# Patient Record
Sex: FEMALE | Race: WHITE | NOT HISPANIC OR LATINO
[De-identification: names, ages, dates, MRNs, and addresses within clinical notes are randomized per-mention and may not be internally consistent; named-entity substitution may affect disease eponyms.]

---

## 2018-02-12 PROBLEM — Z00.00 ENCOUNTER FOR PREVENTIVE HEALTH EXAMINATION: Status: ACTIVE | Noted: 2018-02-12

## 2018-02-21 ENCOUNTER — APPOINTMENT (OUTPATIENT)
Dept: CARDIOLOGY | Facility: CLINIC | Age: 58
End: 2018-02-21
Payer: COMMERCIAL

## 2018-02-21 ENCOUNTER — NON-APPOINTMENT (OUTPATIENT)
Age: 58
End: 2018-02-21

## 2018-02-21 ENCOUNTER — APPOINTMENT (OUTPATIENT)
Dept: PULMONOLOGY | Facility: CLINIC | Age: 58
End: 2018-02-21
Payer: COMMERCIAL

## 2018-02-21 VITALS
DIASTOLIC BLOOD PRESSURE: 71 MMHG | TEMPERATURE: 98.5 F | OXYGEN SATURATION: 94 % | WEIGHT: 115 LBS | HEIGHT: 65 IN | HEART RATE: 89 BPM | BODY MASS INDEX: 19.16 KG/M2 | SYSTOLIC BLOOD PRESSURE: 110 MMHG | RESPIRATION RATE: 12 BRPM

## 2018-02-21 DIAGNOSIS — E87.1 HYPO-OSMOLALITY AND HYPONATREMIA: ICD-10-CM

## 2018-02-21 DIAGNOSIS — Z78.9 OTHER SPECIFIED HEALTH STATUS: ICD-10-CM

## 2018-02-21 PROCEDURE — 99204 OFFICE O/P NEW MOD 45 MIN: CPT | Mod: 25

## 2018-02-21 PROCEDURE — 93306 TTE W/DOPPLER COMPLETE: CPT

## 2018-02-21 PROCEDURE — 99215 OFFICE O/P EST HI 40 MIN: CPT | Mod: 25

## 2018-02-21 PROCEDURE — 94060 EVALUATION OF WHEEZING: CPT

## 2018-02-21 PROCEDURE — 93000 ELECTROCARDIOGRAM COMPLETE: CPT

## 2018-02-22 LAB
ANION GAP SERPL CALC-SCNC: 11 MMOL/L
BUN SERPL-MCNC: 10 MG/DL
CALCIUM SERPL-MCNC: 10.2 MG/DL
CHLORIDE SERPL-SCNC: 93 MMOL/L
CO2 SERPL-SCNC: 31 MMOL/L
CREAT SERPL-MCNC: 0.52 MG/DL
GLUCOSE SERPL-MCNC: 80 MG/DL
NT-PROBNP SERPL-MCNC: 38 PG/ML
POTASSIUM SERPL-SCNC: 4.3 MMOL/L
SODIUM SERPL-SCNC: 135 MMOL/L

## 2018-03-05 ENCOUNTER — APPOINTMENT (OUTPATIENT)
Dept: PULMONOLOGY | Facility: CLINIC | Age: 58
End: 2018-03-05
Payer: COMMERCIAL

## 2018-03-05 VITALS
WEIGHT: 121 LBS | BODY MASS INDEX: 20.16 KG/M2 | HEART RATE: 83 BPM | HEIGHT: 65 IN | TEMPERATURE: 98 F | RESPIRATION RATE: 12 BRPM | OXYGEN SATURATION: 94 % | DIASTOLIC BLOOD PRESSURE: 75 MMHG | SYSTOLIC BLOOD PRESSURE: 119 MMHG

## 2018-03-05 PROCEDURE — 99214 OFFICE O/P EST MOD 30 MIN: CPT | Mod: 25

## 2018-03-05 PROCEDURE — 94621 CARDIOPULM EXERCISE TESTING: CPT

## 2018-03-13 ENCOUNTER — APPOINTMENT (OUTPATIENT)
Dept: PULMONOLOGY | Facility: CLINIC | Age: 58
End: 2018-03-13

## 2018-04-02 ENCOUNTER — NON-APPOINTMENT (OUTPATIENT)
Age: 58
End: 2018-04-02

## 2018-04-02 ENCOUNTER — APPOINTMENT (OUTPATIENT)
Dept: PULMONOLOGY | Facility: CLINIC | Age: 58
End: 2018-04-02
Payer: COMMERCIAL

## 2018-04-02 VITALS
WEIGHT: 126 LBS | SYSTOLIC BLOOD PRESSURE: 115 MMHG | DIASTOLIC BLOOD PRESSURE: 75 MMHG | TEMPERATURE: 98.2 F | HEIGHT: 65 IN | BODY MASS INDEX: 20.99 KG/M2 | OXYGEN SATURATION: 94 % | RESPIRATION RATE: 12 BRPM | HEART RATE: 88 BPM

## 2018-04-02 PROCEDURE — 94060 EVALUATION OF WHEEZING: CPT

## 2018-04-02 PROCEDURE — 99214 OFFICE O/P EST MOD 30 MIN: CPT | Mod: 25

## 2018-04-09 ENCOUNTER — RESULT CHARGE (OUTPATIENT)
Age: 58
End: 2018-04-09

## 2018-05-23 ENCOUNTER — APPOINTMENT (OUTPATIENT)
Dept: CARDIOLOGY | Facility: CLINIC | Age: 58
End: 2018-05-23

## 2018-06-04 ENCOUNTER — NON-APPOINTMENT (OUTPATIENT)
Age: 58
End: 2018-06-04

## 2018-06-04 ENCOUNTER — APPOINTMENT (OUTPATIENT)
Dept: PULMONOLOGY | Facility: CLINIC | Age: 58
End: 2018-06-04
Payer: COMMERCIAL

## 2018-06-04 ENCOUNTER — APPOINTMENT (OUTPATIENT)
Dept: CARDIOLOGY | Facility: CLINIC | Age: 58
End: 2018-06-04
Payer: COMMERCIAL

## 2018-06-04 VITALS
OXYGEN SATURATION: 94 % | RESPIRATION RATE: 12 BRPM | SYSTOLIC BLOOD PRESSURE: 124 MMHG | BODY MASS INDEX: 21.33 KG/M2 | HEART RATE: 90 BPM | HEIGHT: 65 IN | WEIGHT: 128 LBS | DIASTOLIC BLOOD PRESSURE: 82 MMHG

## 2018-06-04 PROCEDURE — 95800 SLP STDY UNATTENDED: CPT | Mod: 26

## 2018-06-04 PROCEDURE — 99214 OFFICE O/P EST MOD 30 MIN: CPT | Mod: 25

## 2018-06-04 PROCEDURE — 94060 EVALUATION OF WHEEZING: CPT

## 2018-06-04 PROCEDURE — 99214 OFFICE O/P EST MOD 30 MIN: CPT

## 2018-07-26 PROBLEM — Z78.9 ALCOHOL USE: Status: INACTIVE | Noted: 2018-02-21

## 2018-09-13 ENCOUNTER — APPOINTMENT (OUTPATIENT)
Dept: PULMONOLOGY | Facility: CLINIC | Age: 58
End: 2018-09-13

## 2018-12-04 ENCOUNTER — APPOINTMENT (OUTPATIENT)
Dept: PULMONOLOGY | Facility: CLINIC | Age: 58
End: 2018-12-04
Payer: COMMERCIAL

## 2018-12-04 ENCOUNTER — NON-APPOINTMENT (OUTPATIENT)
Age: 58
End: 2018-12-04

## 2018-12-04 VITALS
OXYGEN SATURATION: 94 % | HEART RATE: 108 BPM | HEIGHT: 65 IN | WEIGHT: 132 LBS | BODY MASS INDEX: 21.99 KG/M2 | SYSTOLIC BLOOD PRESSURE: 121 MMHG | RESPIRATION RATE: 12 BRPM | TEMPERATURE: 98.2 F | DIASTOLIC BLOOD PRESSURE: 80 MMHG

## 2018-12-04 PROCEDURE — 99214 OFFICE O/P EST MOD 30 MIN: CPT | Mod: 25

## 2018-12-04 PROCEDURE — 94060 EVALUATION OF WHEEZING: CPT

## 2019-08-15 RX ORDER — TIOTROPIUM BROMIDE INHALATION SPRAY 3.12 UG/1
2.5 SPRAY, METERED RESPIRATORY (INHALATION) DAILY
Qty: 3 | Refills: 3 | Status: DISCONTINUED | COMMUNITY
Start: 2018-03-05 | End: 2019-08-15

## 2020-01-07 ENCOUNTER — RX RENEWAL (OUTPATIENT)
Age: 60
End: 2020-01-07

## 2020-01-24 ENCOUNTER — RX RENEWAL (OUTPATIENT)
Age: 60
End: 2020-01-24

## 2020-07-22 ENCOUNTER — RX RENEWAL (OUTPATIENT)
Age: 60
End: 2020-07-22

## 2020-08-17 ENCOUNTER — TRANSCRIPTION ENCOUNTER (OUTPATIENT)
Age: 60
End: 2020-08-17

## 2020-10-15 ENCOUNTER — TRANSCRIPTION ENCOUNTER (OUTPATIENT)
Age: 60
End: 2020-10-15

## 2020-11-04 ENCOUNTER — TRANSCRIPTION ENCOUNTER (OUTPATIENT)
Age: 60
End: 2020-11-04

## 2020-12-14 ENCOUNTER — TRANSCRIPTION ENCOUNTER (OUTPATIENT)
Age: 60
End: 2020-12-14

## 2021-02-10 ENCOUNTER — APPOINTMENT (OUTPATIENT)
Dept: PULMONOLOGY | Facility: CLINIC | Age: 61
End: 2021-02-10
Payer: COMMERCIAL

## 2021-02-10 DIAGNOSIS — Z86.79 PERSONAL HISTORY OF OTHER DISEASES OF THE CIRCULATORY SYSTEM: ICD-10-CM

## 2021-02-10 DIAGNOSIS — Z87.891 PERSONAL HISTORY OF NICOTINE DEPENDENCE: ICD-10-CM

## 2021-02-10 PROCEDURE — 99214 OFFICE O/P EST MOD 30 MIN: CPT | Mod: 95

## 2021-02-10 NOTE — HISTORY OF PRESENT ILLNESS
[Former] : former [Never] : never [TextBox_4] : Patient is a 60-year-old female with past medical history significant for COPD, congestive heart failure, hyponatremia, pulmonary hypertension on home oxygen who is seen via telemedicine.  The patient states that her shortness of breath and dyspnea on exertion are much improved.  She states that she is compliant with her medications and bronchodilator therapy.  She denies fevers chills chest pain weight loss or hemoptysis

## 2021-02-10 NOTE — ASSESSMENT
[FreeTextEntry1] : In summary the patient is a 60-year-old female past medical history significant for chronic obstructive pulmonary disease, hypertension, diastolic heart who presents for pulmonary follow up.  The patient is doing well since her previous visit.  Prescription renewals are performed.  Patient is instructed to continue therapy and to follow-up in 6 months

## 2021-02-10 NOTE — REASON FOR VISIT
[Follow-Up] : a follow-up visit [COPD] : COPD [Pulmonary Hypertension] : pulmonary hypertension [Home] : at home, [unfilled] , at the time of the visit. [Medical Office: ([facility])___] : at the medical office located in

## 2021-04-22 ENCOUNTER — TRANSCRIPTION ENCOUNTER (OUTPATIENT)
Age: 61
End: 2021-04-22

## 2021-06-09 ENCOUNTER — TRANSCRIPTION ENCOUNTER (OUTPATIENT)
Age: 61
End: 2021-06-09

## 2021-07-22 ENCOUNTER — TRANSCRIPTION ENCOUNTER (OUTPATIENT)
Age: 61
End: 2021-07-22

## 2021-08-17 ENCOUNTER — RX RENEWAL (OUTPATIENT)
Age: 61
End: 2021-08-17

## 2021-08-18 ENCOUNTER — TRANSCRIPTION ENCOUNTER (OUTPATIENT)
Age: 61
End: 2021-08-18

## 2021-12-21 ENCOUNTER — TRANSCRIPTION ENCOUNTER (OUTPATIENT)
Age: 61
End: 2021-12-21

## 2022-06-13 ENCOUNTER — NON-APPOINTMENT (OUTPATIENT)
Age: 62
End: 2022-06-13

## 2022-06-16 ENCOUNTER — INPATIENT (INPATIENT)
Facility: HOSPITAL | Age: 62
LOS: 5 days | Discharge: HOME HEALTH | DRG: 190 | End: 2022-06-22
Attending: HOSPITALIST | Admitting: STUDENT IN AN ORGANIZED HEALTH CARE EDUCATION/TRAINING PROGRAM
Payer: COMMERCIAL

## 2022-06-16 VITALS
SYSTOLIC BLOOD PRESSURE: 172 MMHG | HEIGHT: 65 IN | RESPIRATION RATE: 24 BRPM | HEART RATE: 119 BPM | OXYGEN SATURATION: 93 % | WEIGHT: 110.01 LBS | TEMPERATURE: 98 F | DIASTOLIC BLOOD PRESSURE: 101 MMHG

## 2022-06-16 DIAGNOSIS — R74.01 ELEVATION OF LEVELS OF LIVER TRANSAMINASE LEVELS: ICD-10-CM

## 2022-06-16 DIAGNOSIS — A41.9 SEPSIS, UNSPECIFIED ORGANISM: ICD-10-CM

## 2022-06-16 DIAGNOSIS — Z29.8 ENCOUNTER FOR OTHER SPECIFIED PROPHYLACTIC MEASURES: ICD-10-CM

## 2022-06-16 DIAGNOSIS — J96.01 ACUTE RESPIRATORY FAILURE WITH HYPOXIA: ICD-10-CM

## 2022-06-16 DIAGNOSIS — E87.1 HYPO-OSMOLALITY AND HYPONATREMIA: ICD-10-CM

## 2022-06-16 DIAGNOSIS — D75.1 SECONDARY POLYCYTHEMIA: ICD-10-CM

## 2022-06-16 DIAGNOSIS — R09.02 HYPOXEMIA: ICD-10-CM

## 2022-06-16 DIAGNOSIS — Z87.09 PERSONAL HISTORY OF OTHER DISEASES OF THE RESPIRATORY SYSTEM: ICD-10-CM

## 2022-06-16 DIAGNOSIS — E83.52 HYPERCALCEMIA: ICD-10-CM

## 2022-06-16 DIAGNOSIS — R94.31 ABNORMAL ELECTROCARDIOGRAM [ECG] [EKG]: ICD-10-CM

## 2022-06-16 LAB
ALBUMIN SERPL ELPH-MCNC: 4.6 G/DL — SIGNIFICANT CHANGE UP (ref 3.3–5)
ALP SERPL-CCNC: 181 U/L — HIGH (ref 40–120)
ALT FLD-CCNC: 46 U/L — HIGH (ref 10–45)
ANION GAP SERPL CALC-SCNC: 13 MMOL/L — SIGNIFICANT CHANGE UP (ref 5–17)
AST SERPL-CCNC: 34 U/L — SIGNIFICANT CHANGE UP (ref 10–40)
BASOPHILS # BLD AUTO: 0.06 K/UL — SIGNIFICANT CHANGE UP (ref 0–0.2)
BASOPHILS NFR BLD AUTO: 0.5 % — SIGNIFICANT CHANGE UP (ref 0–2)
BILIRUB SERPL-MCNC: 0.3 MG/DL — SIGNIFICANT CHANGE UP (ref 0.2–1.2)
BUN SERPL-MCNC: 11 MG/DL — SIGNIFICANT CHANGE UP (ref 7–23)
CALCIUM SERPL-MCNC: 11.5 MG/DL — HIGH (ref 8.4–10.5)
CHLORIDE SERPL-SCNC: 92 MMOL/L — LOW (ref 96–108)
CO2 SERPL-SCNC: 27 MMOL/L — SIGNIFICANT CHANGE UP (ref 22–31)
CREAT SERPL-MCNC: 0.37 MG/DL — LOW (ref 0.5–1.3)
EGFR: 116 ML/MIN/1.73M2 — SIGNIFICANT CHANGE UP
EOSINOPHIL # BLD AUTO: 0.03 K/UL — SIGNIFICANT CHANGE UP (ref 0–0.5)
EOSINOPHIL NFR BLD AUTO: 0.2 % — SIGNIFICANT CHANGE UP (ref 0–6)
GAS PNL BLDV: SIGNIFICANT CHANGE UP
GLUCOSE SERPL-MCNC: 126 MG/DL — HIGH (ref 70–99)
GRAM STN FLD: SIGNIFICANT CHANGE UP
HCT VFR BLD CALC: 54.8 % — HIGH (ref 34.5–45)
HGB BLD-MCNC: 17.7 G/DL — HIGH (ref 11.5–15.5)
IMM GRANULOCYTES NFR BLD AUTO: 0.8 % — SIGNIFICANT CHANGE UP (ref 0–1.5)
LYMPHOCYTES # BLD AUTO: 1.27 K/UL — SIGNIFICANT CHANGE UP (ref 1–3.3)
LYMPHOCYTES # BLD AUTO: 9.7 % — LOW (ref 13–44)
MCHC RBC-ENTMCNC: 29.4 PG — SIGNIFICANT CHANGE UP (ref 27–34)
MCHC RBC-ENTMCNC: 32.3 GM/DL — SIGNIFICANT CHANGE UP (ref 32–36)
MCV RBC AUTO: 90.9 FL — SIGNIFICANT CHANGE UP (ref 80–100)
MONOCYTES # BLD AUTO: 1.1 K/UL — HIGH (ref 0–0.9)
MONOCYTES NFR BLD AUTO: 8.4 % — SIGNIFICANT CHANGE UP (ref 2–14)
NEUTROPHILS # BLD AUTO: 10.49 K/UL — HIGH (ref 1.8–7.4)
NEUTROPHILS NFR BLD AUTO: 80.4 % — HIGH (ref 43–77)
NRBC # BLD: 0 /100 WBCS — SIGNIFICANT CHANGE UP (ref 0–0)
PLATELET # BLD AUTO: 261 K/UL — SIGNIFICANT CHANGE UP (ref 150–400)
POTASSIUM SERPL-MCNC: 4.4 MMOL/L — SIGNIFICANT CHANGE UP (ref 3.5–5.3)
POTASSIUM SERPL-SCNC: 4.4 MMOL/L — SIGNIFICANT CHANGE UP (ref 3.5–5.3)
PROT SERPL-MCNC: 8 G/DL — SIGNIFICANT CHANGE UP (ref 6–8.3)
RAPID RVP RESULT: SIGNIFICANT CHANGE UP
RBC # BLD: 6.03 M/UL — HIGH (ref 3.8–5.2)
RBC # FLD: 12.3 % — SIGNIFICANT CHANGE UP (ref 10.3–14.5)
SARS-COV-2 RNA SPEC QL NAA+PROBE: SIGNIFICANT CHANGE UP
SODIUM SERPL-SCNC: 132 MMOL/L — LOW (ref 135–145)
SPECIMEN SOURCE: SIGNIFICANT CHANGE UP
TSH SERPL-MCNC: 0.33 UIU/ML — SIGNIFICANT CHANGE UP (ref 0.27–4.2)
WBC # BLD: 13.06 K/UL — HIGH (ref 3.8–10.5)
WBC # FLD AUTO: 13.06 K/UL — HIGH (ref 3.8–10.5)

## 2022-06-16 PROCEDURE — 99223 1ST HOSP IP/OBS HIGH 75: CPT

## 2022-06-16 PROCEDURE — 71045 X-RAY EXAM CHEST 1 VIEW: CPT | Mod: 26

## 2022-06-16 PROCEDURE — 99285 EMERGENCY DEPT VISIT HI MDM: CPT | Mod: 25

## 2022-06-16 PROCEDURE — 93010 ELECTROCARDIOGRAM REPORT: CPT | Mod: NC

## 2022-06-16 RX ORDER — ACETAMINOPHEN 500 MG
650 TABLET ORAL EVERY 6 HOURS
Refills: 0 | Status: DISCONTINUED | OUTPATIENT
Start: 2022-06-16 | End: 2022-06-22

## 2022-06-16 RX ORDER — IPRATROPIUM/ALBUTEROL SULFATE 18-103MCG
3 AEROSOL WITH ADAPTER (GRAM) INHALATION
Refills: 0 | Status: COMPLETED | OUTPATIENT
Start: 2022-06-16 | End: 2022-06-16

## 2022-06-16 RX ORDER — IPRATROPIUM/ALBUTEROL SULFATE 18-103MCG
3 AEROSOL WITH ADAPTER (GRAM) INHALATION EVERY 6 HOURS
Refills: 0 | Status: DISCONTINUED | OUTPATIENT
Start: 2022-06-16 | End: 2022-06-22

## 2022-06-16 RX ORDER — ONDANSETRON 8 MG/1
4 TABLET, FILM COATED ORAL EVERY 8 HOURS
Refills: 0 | Status: DISCONTINUED | OUTPATIENT
Start: 2022-06-16 | End: 2022-06-22

## 2022-06-16 RX ORDER — LANOLIN ALCOHOL/MO/W.PET/CERES
3 CREAM (GRAM) TOPICAL AT BEDTIME
Refills: 0 | Status: DISCONTINUED | OUTPATIENT
Start: 2022-06-16 | End: 2022-06-22

## 2022-06-16 RX ORDER — SODIUM CHLORIDE 9 MG/ML
1000 INJECTION INTRAMUSCULAR; INTRAVENOUS; SUBCUTANEOUS ONCE
Refills: 0 | Status: COMPLETED | OUTPATIENT
Start: 2022-06-16 | End: 2022-06-16

## 2022-06-16 RX ORDER — SODIUM CHLORIDE 9 MG/ML
1000 INJECTION INTRAMUSCULAR; INTRAVENOUS; SUBCUTANEOUS
Refills: 0 | Status: DISCONTINUED | OUTPATIENT
Start: 2022-06-16 | End: 2022-06-22

## 2022-06-16 RX ORDER — INFLUENZA VIRUS VACCINE 15; 15; 15; 15 UG/.5ML; UG/.5ML; UG/.5ML; UG/.5ML
0.5 SUSPENSION INTRAMUSCULAR ONCE
Refills: 0 | Status: DISCONTINUED | OUTPATIENT
Start: 2022-06-16 | End: 2022-06-22

## 2022-06-16 RX ORDER — BUDESONIDE AND FORMOTEROL FUMARATE DIHYDRATE 160; 4.5 UG/1; UG/1
2 AEROSOL RESPIRATORY (INHALATION)
Refills: 0 | Status: DISCONTINUED | OUTPATIENT
Start: 2022-06-16 | End: 2022-06-17

## 2022-06-16 RX ORDER — ENOXAPARIN SODIUM 100 MG/ML
40 INJECTION SUBCUTANEOUS EVERY 24 HOURS
Refills: 0 | Status: DISCONTINUED | OUTPATIENT
Start: 2022-06-16 | End: 2022-06-22

## 2022-06-16 RX ADMIN — ENOXAPARIN SODIUM 40 MILLIGRAM(S): 100 INJECTION SUBCUTANEOUS at 18:57

## 2022-06-16 RX ADMIN — Medication 3 MILLILITER(S): at 12:24

## 2022-06-16 RX ADMIN — SODIUM CHLORIDE 1000 MILLILITER(S): 9 INJECTION INTRAMUSCULAR; INTRAVENOUS; SUBCUTANEOUS at 14:09

## 2022-06-16 RX ADMIN — Medication 40 MILLIGRAM(S): at 11:01

## 2022-06-16 RX ADMIN — Medication 3 MILLILITER(S): at 12:20

## 2022-06-16 RX ADMIN — SODIUM CHLORIDE 60 MILLILITER(S): 9 INJECTION INTRAMUSCULAR; INTRAVENOUS; SUBCUTANEOUS at 18:54

## 2022-06-16 RX ADMIN — Medication 600 MILLIGRAM(S): at 21:19

## 2022-06-16 RX ADMIN — Medication 3 MILLILITER(S): at 18:54

## 2022-06-16 RX ADMIN — Medication 40 MILLIGRAM(S): at 21:20

## 2022-06-16 RX ADMIN — BUDESONIDE AND FORMOTEROL FUMARATE DIHYDRATE 2 PUFF(S): 160; 4.5 AEROSOL RESPIRATORY (INHALATION) at 18:55

## 2022-06-16 RX ADMIN — Medication 3 MILLILITER(S): at 11:03

## 2022-06-16 NOTE — ED PROVIDER NOTE - PROGRESS NOTE DETAILS
Persistent hypoxia. Abx for possible RLL pna in setting of COPD. Awaiting COVID as alternative cause of symptoms. Admit for further management

## 2022-06-16 NOTE — ED PROVIDER NOTE - CARE PLAN
1 Principal Discharge DX:	COPD exacerbation   Principal Discharge DX:	Hypoxia  Secondary Diagnosis:	COPD exacerbation

## 2022-06-16 NOTE — ED PROVIDER NOTE - PHYSICAL EXAMINATION
Physical Exam:  Gen: AOx3, mild tachypnea  Head: NCAT  HEENT: EOMI, PEERLA, normal conjunctiva, tongue midline, oral mucosa moist  Lung: decreased air movement b/l with expiratory wheeze, 90% RA, mild tachypnea, desat when speaking  CV: RRR, no murmurs, rubs or gallops, distal pulses 2+ b/l  Abd: soft, NT, ND  MSK: no visible deformities, ROM normal in UE/LE  Skin: Warm, well perfused, no rash, no leg swelling  Psych: normal affect, calm Physical Exam:  Gen: AOx3, mild tachypnea  Head: NCAT  HEENT: EOMI, PEERLA, normal conjunctiva, tongue midline, oral mucosa moist  Lung: decreased air movement b/l with expiratory wheeze, 86-90% RA, mild tachypnea, desat when speaking  CV: RRR, no murmurs, rubs or gallops, distal pulses 2+ b/l  Abd: soft, NT, ND  MSK: no visible deformities, ROM normal in UE/LE  Skin: Warm, well perfused, no rash, no leg swelling  Psych: normal affect, calm

## 2022-06-16 NOTE — ED CLERICAL - NS ED CLERK UNITS
-- DO NOT REPLY / DO NOT REPLY ALL --  -- Message is from the [facility]--    COVID-19 Universal Screening: Negative    General Patient Message      Reason for Call: patient is calling in because he needs a return to work letter    Caller Information       Type Contact Phone    03/26/2020 08:38 AM Phone (Incoming) Arjun Hodges (Self) [phone] (H)          Alternative phone number: [phone]    Turnaround time given to caller:   \"This message will be sent to [state Provider's name]. The clinical team will fulfill your request as soon as they review your message.\"    
Requested Prescriptions   Pending Prescriptions Disp Refills     traMADol (ULTRAM) 50 MG tablet [Pharmacy Med Name: TRAMADOL HCL 50MG TABS] 120 tablet 0     Sig: TAKE ONE TABLET BY MOUTH EVERY 6 HOURS AS NEEDED     Last Written Prescription Date:  11/18/2020  Last Fill Quantity: 120,   # refills: 0  Last Office Visit: 09/28/2020  Future Office visit:       Routing refill request to provider for review/approval because:  Drug not on the [facility], [facility] or [facility] refill protocol or controlled substance    Mila Gaytan MA     
APER

## 2022-06-16 NOTE — H&P ADULT - MUSCULOSKELETAL
negative normal/no joint erythema/no joint warmth/no calf tenderness/normal gait/strength 5/5 bilateral upper extremities/strength 5/5 bilateral lower extremities

## 2022-06-16 NOTE — PATIENT PROFILE ADULT - FALL HARM RISK - RISK INTERVENTIONS

## 2022-06-16 NOTE — H&P ADULT - PROBLEM SELECTOR PLAN 7
mild alk phos and ALT elevated, AST and tbili wnl  suspect related to sepsis  no abd pain  -trend cmp

## 2022-06-16 NOTE — ED ADULT NURSE REASSESSMENT NOTE - NS ED NURSE REASSESS COMMENT FT1
Pt admitted to medicine, awaiting bed placement at this time. Pt and family aware of plan of care at this time.

## 2022-06-16 NOTE — H&P ADULT - NSHPSOCIALHISTORY_GEN_ALL_CORE
+active smoker  denies drug use  denies drinking +active smoker, smokes ppd  x30 years  denies drug use  denies drinking

## 2022-06-16 NOTE — H&P ADULT - PROBLEM SELECTOR PLAN 2
Ca 11.5  send phos  send PTH, vitamin D  -monitor bmp severe sepsis likely to multifocal bacterial pna(although procal minimally elevated)  -tachy, leukocytosis   -send vbg with lactate  -IVH  -send blood cx  -c/w levaquin 750mg as above  -send sputum culture and blood cx  -urine legionella  -BP stable, sinus tachycardia likely due to duoneb, steroids, sepsis infection, no risk factor, hx and exam not consistent PE/DVT  -f/u TSH

## 2022-06-16 NOTE — H&P ADULT - PROBLEM SELECTOR PLAN 8
dvt ppx: lovenox 40mg daily    discussed with TRINO Thrasher and daughter bedside who is an RN who works here

## 2022-06-16 NOTE — H&P ADULT - PROBLEM SELECTOR PLAN 5
dvt ppx: lovenox 40mg daily    discussed with NP mild alk phos and ALT elevated, AST and tbili wnl  no abd pain  -trend cmp hgb 17.7 suspect secondary given active smoker vs dehydration  -send epo and jak2 in AM  -monitor cbc

## 2022-06-16 NOTE — ED PROVIDER NOTE - OBJECTIVE STATEMENT
60yo female COPD active smoker p/w 3 days worsening dypsnea and noted pulse ox at home to be 80's. In triage 59% and immediately brought back to room, pulse ox fluctuating between mid 80's and low 90's, placed on 2L NC. States she went to  last week when had sinus congestion, given 10 days of amoxicillin. Took albuterol at home without relief of her respiratory symptoms. Notes productive cough. Denies chest pain, leg swelling, h/o PE/dVt. 62yo female COPD active smoker p/w 3 days worsening dypsnea and noted pulse ox at home to be 80's. In triage 59% and immediately brought back to room, pulse ox fluctuating between mid 80's and low 90's, placed on 2L NC. States she went to  last week when had sinus congestion, mild cough, given 10 days of amoxicillin. Took albuterol at home without relief of her respiratory symptoms. Notes productive cough. Denies fever, chest pain, leg swelling, h/o PE/dVt.

## 2022-06-16 NOTE — H&P ADULT - NSHPLABSRESULTS_GEN_ALL_CORE
personally reviewed labs:                        17.7   13.06 )-----------( 261      ( 16 Jun 2022 11:11 )             54.8       06-16    132<L>  |  92<L>  |  11  ----------------------------<  126<H>  4.4   |  27  |  0.37<L>    Ca    11.5<H>      16 Jun 2022 11:11    TPro  8.0  /  Alb  4.6  /  TBili  0.3  /  DBili  x   /  AST  34  /  ALT  46<H>  /  AlkPhos  181<H>  06-16          personally reviewed CXR    personally reviewed EKG: personally reviewed labs:                        17.7   13.06 )-----------( 261      ( 16 Jun 2022 11:11 )             54.8       06-16    132<L>  |  92<L>  |  11  ----------------------------<  126<H>  4.4   |  27  |  0.37<L>    Ca    11.5<H>      16 Jun 2022 11:11    TPro  8.0  /  Alb  4.6  /  TBili  0.3  /  DBili  x   /  AST  34  /  ALT  46<H>  /  AlkPhos  181<H>  06-16          personally reviewed CXR: FINDINGS:  The heart is normal in size.  Right peripheral midlung opacity.  Questionable lower left lung infiltrate as well.  Small right pleural effusion.  No pneumothorax.    IMPRESSION:  Infiltrate(s) consistent with pneumonia    personally reviewed EKG: personally reviewed EKG: sinus tachy @ 109, biatrial enlargement, pulmonary disease pattern

## 2022-06-16 NOTE — H&P ADULT - PROBLEM SELECTOR PLAN 6
dvt ppx: lovenox 40mg daily    discussed with NP 132 mild, suspect poor po intake, asymptaomtic  trend bmp  encourage po intake 132 mild, suspect poor po intake, asymptomatic  trend bmp  encourage po intake

## 2022-06-16 NOTE — H&P ADULT - ASSESSMENT
62yo female COPD active smoker p/w 5days sob, cough, URI symptoms admitted for acute respiratory failure with hypoxia and severe sepsis due to pna

## 2022-06-16 NOTE — H&P ADULT - PROBLEM SELECTOR PLAN 4
mild alk phos and ALT elevated, AST and tbili wnl  no abd pain  -trend cmp 132 mild  trend bmp sinus tachy with biatrial enlargement, likely due to pulm disease  -sinus tachycardia likely due to duoneb, steroids, sepsis infection, no risk factor, hx and exam not consistent PE/DVT  -no cp, trop negative  -cards eval

## 2022-06-16 NOTE — H&P ADULT - PROBLEM SELECTOR PLAN 3
132 mild  trend bmp hgb 17.7 suspect secondary given active smoker Ca 11.5, possible mild dehydration  phos wnl  -IVH  -send PTH, vitamin D  -monitor bmp

## 2022-06-16 NOTE — ED ADULT NURSE NOTE - OBJECTIVE STATEMENT
62 y/o female PMH COPD not on home O2 presents to ED reporting headache and SOB. Pt reports o2 sat of 85% while ambulating today. Pt also reports having headache the past few days and SOB. On exam, AOx3, speaking in complete sentences. Unlabored, spontaneous respirations, NAD. Abdomen soft, non-tender, non-distended. Pt denies CP, abdominal pain, n/v/d, fever/chills at this time. Heplock placed, labs sent.

## 2022-06-16 NOTE — H&P ADULT - PROBLEM SELECTOR PLAN 1
Underlying COPD active smoker not on home 02 was hypoxic to 60-80 at home/ER improved to low 90s with in 2L NC breathing comfortably  -likely due to bacterial pna and copd exacerbation  -has RML and ?LLL PNA on CXR   -RVP negative  -c/w 2L NC, wean as tolerated  -s/p levaquin in ER, will c/w levaquin 750mg daily  -start IV solumedrol 40mg q12  -duoneb q6 ATC  -pulm consulted Underlying COPD active smoker not on home 02 was hypoxic to 60-80 at home/ER improved to low 90s with in 2L NC breathing comfortably  -likely due to bacterial pna and copd exacerbation  -has RML and ?LLL PNA on CXR   -RVP negative  -c/w 2L NC, wean as tolerated  -s/p levaquin in ER, will c/w levaquin 750mg daily  -start IV solumedrol 40mg q12  -start Symbicort bid (enora at home)  -start Duoneb q6 ATC  -supportive care  -pulm consulted

## 2022-06-16 NOTE — ED PROVIDER NOTE - CLINICAL SUMMARY MEDICAL DECISION MAKING FREE TEXT BOX
60yo female cOPD p/w dyspnea, productive cough, hypoxia, decreased air movement and wheezing c/w COPD exacerbation. EKG with peaked t waves, eval for hyper K. Do not suspect ACS in context of other respiratory symptoms. Eval for covid vs PNA. CXr, labs, nebs, likely admit. 60yo female cOPD p/w dyspnea, productive cough, hypoxia, decreased air movement and wheezing c/w COPD exacerbation. EKG with peaked t waves, eval for hyper K. Do not suspect ACS in context of other respiratory symptoms. Eval for covid vs PNA. CXr, labs, nebs, likely admit.    Angella Ryan MD - Attending Physician: Pt here with cough, sob and hypoxia in setting of known COPD. ?COPD exacerbation vs COVID vs other viral triggered pna. Xr, labs, nebs, steroids, oxygen therapy. Baseline sat 92-94% per patient

## 2022-06-16 NOTE — H&P ADULT - HISTORY OF PRESENT ILLNESS
62yo female COPD active smoker p/w 3 days worsening sob with productive cough and noted pulse ox at home to be 80's. In ER triage 59%, in back pulse ox fluctuating between mid 80's and low 90's, placed on 2L NC.  Took albuterol at home without relief of her respiratory symptom. Last week went to urgent care for sinus congestion, mild cough, given 10 days of amoxicillin.s.Denies fever, chest pain, leg swelling/pain, h/o PE/dVt.      ED vitals: afebrile, 119, 172/101, 24, 94% on 2L after being ?59-80 on RA  In ER was given levaquin 750mg, solumedrol 40mg, duoneb 62yo female COPD active smoker p/w 5days worsening sob at rest with productive cough and noted pulse ox at home to be 80's. Daughter who is an RN went to her house and said pt lips were blue and 02 was in low 80s. In ER triage 59% but responded to 2L NC satting low 90s without distress. Went to urgent care 2 days ago and Reports taking amoxicillin and nebulizer at home but did not help. Reports feeling much better after ER treatment without any further sob.  +associated headache, nasal congestion for past few days. Denies fever, chest pain, leg swelling/pain, h/o PE/DVT, prolonged immobility n/v, diarrhea, abd pain. Boosted x 1 for covid with [employer] and also reports taken pneumovax vaccine a few years ago. Her daughter tested positive for covid 2 weeks ago and she has not seen her since. Reports being intubated in past for bacterial pna.       ED vitals: afebrile, 119, 172/101, 24, 94% on 2L after being ?59-80 on RA  In ER was given levaquin 750mg, solumedrol 40mg, duoneb

## 2022-06-16 NOTE — CONSULT NOTE ADULT - SUBJECTIVE AND OBJECTIVE BOX
DATE OF SERVICE: 06-16-22 @ 17:41    CHIEF COMPLAINT:Patient is a 61y old  Female who presents with a chief complaint of sob (16 Jun 2022 12:24)      HISTORY OF PRESENT ILLNESS:HPI:  62yo female COPD active smoker p/w 5days worsening sob at rest with productive cough and noted pulse ox at home to be 80's. Daughter who is an RN went to her house and said pt lips were blue and 02 was in low 80s. In ER triage 59% but responded to 2L NC satting low 90s without distress. Went to urgent care 2 days ago and Reports taking amoxicillin and nebulizer at home but did not help. Reports feeling much better after ER treatment without any further sob.  +associated headache, nasal congestion for past few days. Denies fever, chest pain, leg swelling/pain, h/o PE/DVT, prolonged immobility n/v, diarrhea, abd pain. Boosted x 1 for covid with [employer] and also reports taken pneumovax vaccine a few years ago. Her daughter tested positive for covid 2 weeks ago and she has not seen her since. Reports being intubated in past for bacterial pna.       ED vitals: afebrile, 119, 172/101, 24, 94% on 2L after being ?59-80 on RA  In ER was given levaquin 750mg, solumedrol 40mg, duoneb (16 Jun 2022 12:24)      PAST MEDICAL & SURGICAL HISTORY:  History of COPD      No significant past surgical history              MEDICATIONS:  enoxaparin Injectable 40 milliGRAM(s) SubCutaneous every 24 hours    levoFLOXacin IVPB        albuterol/ipratropium for Nebulization 3 milliLiter(s) Nebulizer every 6 hours  benzonatate 100 milliGRAM(s) Oral three times a day PRN  budesonide 160 MICROgram(s)/formoterol 4.5 MICROgram(s) Inhaler 2 Puff(s) Inhalation two times a day  guaiFENesin  milliGRAM(s) Oral every 12 hours    acetaminophen     Tablet .. 650 milliGRAM(s) Oral every 6 hours PRN  melatonin 3 milliGRAM(s) Oral at bedtime PRN  ondansetron Injectable 4 milliGRAM(s) IV Push every 8 hours PRN      methylPREDNISolone sodium succinate Injectable 40 milliGRAM(s) IV Push every 12 hours    sodium chloride 0.9%. 1000 milliLiter(s) IV Continuous <Continuous>      FAMILY HISTORY:      Non-contributory    SOCIAL HISTORY:    [ ] Tobacco  [ ] Drugs  [ ] Alcohol    Allergies    No Known Allergies    Intolerances    	    REVIEW OF SYSTEMS:  CONSTITUTIONAL: No fever  EYES: No eye pain, visual disturbances, or discharge  ENMT:  No difficulty hearing, tinnitus  NECK: No pain or stiffness  RESPIRATORY: No cough, wheezing,  CARDIOVASCULAR: No chest pain, palpitations, passing out, dizziness, or leg swelling  GASTROINTESTINAL:  No nausea, vomiting, diarrhea or constipation. No melena.  GENITOURINARY: No dysuria, hematuria  NEUROLOGICAL: No stroke like symptoms  SKIN: No burning or lesions   ENDOCRINE: No heat or cold intolerance  MUSCULOSKELETAL: No joint pain or swelling  PSYCHIATRIC: No  anxiety, mood swings  HEME/LYMPH: No bleeding gums  ALLERGY AND IMMUNOLOGIC: No hives or eczema	    All other ROS negative    PHYSICAL EXAM:  T(C): 36.9 (06-16-22 @ 16:26), Max: 36.9 (06-16-22 @ 16:26)  HR: 112 (06-16-22 @ 16:26) (111 - 120)  BP: 152/90 (06-16-22 @ 16:26) (133/70 - 172/101)  RR: 27 (06-16-22 @ 16:26) (24 - 28)  SpO2: 90% (06-16-22 @ 16:26) (81% - 94%)  Wt(kg): --  I&O's Summary      Appearance: Normal	  HEENT:   Normal oral mucosa, EOMI	  Cardiovascular:  S1 S2, No JVD,    Respiratory: Lungs clear to auscultation	  Psychiatry: Alert  Gastrointestinal:  Soft, Non-tender, + BS	  Skin: No rashes   Neurologic: Non-focal  Extremities:  No edema  Vascular: Peripheral pulses palpable    	    	  	  CARDIAC MARKERS:  Labs personally reviewed by me                                  17.7   13.06 )-----------( 261      ( 16 Jun 2022 11:11 )             54.8     06-16    132<L>  |  92<L>  |  11  ----------------------------<  126<H>  4.4   |  27  |  0.37<L>    Ca    11.5<H>      16 Jun 2022 11:11  Phos  3.2     06-16    TPro  8.0  /  Alb  4.6  /  TBili  0.3  /  DBili  x   /  AST  34  /  ALT  46<H>  /  AlkPhos  181<H>  06-16          EKG: Personally reviewed by me -   Radiology: Personally reviewed by me -       Assessment /Plan:     Pt seen and examined, recs to follow        Differential diagnosis and plan of care discussed with patient after the evaluation. Counseling on diet, nutritional counseling, weight management, exercise and medication compliance was done.   Advanced care planning/advanced directives discussed with patient/family. DNR status including forceful chest compressions to attempt to restart the heart, ventilator support/artificial breathing, electric shock, artificial nutrition, health care proxy, Molst form all discussed with pt. Pt wishes to consider. More than fifteen minutes spent on discussing advanced directives.        Scott England DO [facility]  Cardiovascular Medicine  [address] [place] [address], [address]  Office [phone]  Cell [phone] DATE OF SERVICE: 06-16-22 @ 17:41    CHIEF COMPLAINT:Patient is a 61y old  Female who presents with a chief complaint of sob (16 Jun 2022 12:24)      HISTORY OF PRESENT ILLNESS:HPI:  60yo female COPD active smoker p/w 5days worsening sob at rest with productive cough and noted pulse ox at home to be 80's. Daughter who is an RN went to her house and said pt lips were blue and 02 was in low 80s. In ER triage 59% but responded to 2L NC satting low 90s without distress. Went to urgent care 2 days ago and Reports taking amoxicillin and nebulizer at home but did not help. Reports feeling much better after ER treatment without any further sob.  +associated headache, nasal congestion for past few days. Denies fever, chest pain, leg swelling/pain, h/o PE/DVT, prolonged immobility n/v, diarrhea, abd pain. Boosted x 1 for covid with [employer] and also reports taken pneumovax vaccine a few years ago. Her daughter tested positive for covid 2 weeks ago and she has not seen her since. Reports being intubated in past for bacterial pna.       ED vitals: afebrile, 119, 172/101, 24, 94% on 2L after being ?59-80 on RA  In ER was given levaquin 750mg, solumedrol 40mg, duoneb (16 Jun 2022 12:24)      PAST MEDICAL & SURGICAL HISTORY:  History of COPD      No significant past surgical history              MEDICATIONS:  enoxaparin Injectable 40 milliGRAM(s) SubCutaneous every 24 hours    levoFLOXacin IVPB        albuterol/ipratropium for Nebulization 3 milliLiter(s) Nebulizer every 6 hours  benzonatate 100 milliGRAM(s) Oral three times a day PRN  budesonide 160 MICROgram(s)/formoterol 4.5 MICROgram(s) Inhaler 2 Puff(s) Inhalation two times a day  guaiFENesin  milliGRAM(s) Oral every 12 hours    acetaminophen     Tablet .. 650 milliGRAM(s) Oral every 6 hours PRN  melatonin 3 milliGRAM(s) Oral at bedtime PRN  ondansetron Injectable 4 milliGRAM(s) IV Push every 8 hours PRN      methylPREDNISolone sodium succinate Injectable 40 milliGRAM(s) IV Push every 12 hours    sodium chloride 0.9%. 1000 milliLiter(s) IV Continuous <Continuous>      FAMILY HISTORY:      Non-contributory    SOCIAL HISTORY:    [ ] not a smoker    Allergies    No Known Allergies    Intolerances    	    REVIEW OF SYSTEMS:  CONSTITUTIONAL: No fever  EYES: No eye pain, visual disturbances, or discharge  ENMT:  No difficulty hearing, tinnitus  NECK: No pain or stiffness  RESPIRATORY: + SOB cough, wheezing,  CARDIOVASCULAR: No chest pain, palpitations, passing out, dizziness, or leg swelling  GASTROINTESTINAL:  No nausea, vomiting, diarrhea or constipation. No melena.  GENITOURINARY: No dysuria, hematuria  NEUROLOGICAL: No stroke like symptoms  SKIN: No burning or lesions   ENDOCRINE: No heat or cold intolerance  MUSCULOSKELETAL: No joint pain or swelling  PSYCHIATRIC: No  anxiety, mood swings  HEME/LYMPH: No bleeding gums  ALLERGY AND IMMUNOLOGIC: No hives or eczema	    All other ROS negative    PHYSICAL EXAM:  T(C): 36.9 (06-16-22 @ 16:26), Max: 36.9 (06-16-22 @ 16:26)  HR: 112 (06-16-22 @ 16:26) (111 - 120)  BP: 152/90 (06-16-22 @ 16:26) (133/70 - 172/101)  RR: 27 (06-16-22 @ 16:26) (24 - 28)  SpO2: 90% (06-16-22 @ 16:26) (81% - 94%)  Wt(kg): --  I&O's Summary      Appearance: Normal	  HEENT:   Normal oral mucosa, EOMI	  Cardiovascular:  S1 S2, No JVD,    Respiratory: Lungs clear to auscultation	  Psychiatry: Alert  Gastrointestinal:  Soft, Non-tender, + BS	  Skin: No rashes   Neurologic: Non-focal  Extremities:  No edema  Vascular: Peripheral pulses palpable    	    	  	  CARDIAC MARKERS:  Labs personally reviewed by me                                  17.7   13.06 )-----------( 261      ( 16 Jun 2022 11:11 )             54.8     06-16    132<L>  |  92<L>  |  11  ----------------------------<  126<H>  4.4   |  27  |  0.37<L>    Ca    11.5<H>      16 Jun 2022 11:11  Phos  3.2     06-16    TPro  8.0  /  Alb  4.6  /  TBili  0.3  /  DBili  x   /  AST  34  /  ALT  46<H>  /  AlkPhos  181<H>  06-16          EKG: Personally reviewed by me - Sinus tach, RAD, hyperacure T waves  Radiology: Personally reviewed by me -   < from: Xray Chest 1 View- PORTABLE-Urgent (Xray Chest 1 View- PORTABLE-Urgent .) (06.16.22 @ 11:22) >  IMPRESSION:  Infiltrate(s) consistentwith pneumonia.    < end of copied text >      Assessment:  · Assessment	  60yo female COPD active smoker p/w 5days sob, cough, URI symptoms admitted for acute respiratory failure with hypoxia and severe sepsis due to pna    Problem/Plan - 1:  ·  Problem: Acute respiratory failure with hypoxia.   ·  Plan: Underlying COPD active smoker not on home 02 was hypoxic to 60-80 at home/ER improved to low 90s with in 2L NC breathing comfortably  -likely due to bacterial pna and copd exacerbation  - abx and steroids as per primary team and pulm    Problem/Plan - 2:  ·  Problem: Abnormal EKG   ·  Plan: findings mostly consistent with known advanced COPD  - hyperacute T waves but normal K and no chest pain, trops negative  - echo, though may be done as OP    Problem/Plan - 3:  ·  Problem: Erythrocytosis.   ·  Plan: hgb 17.7 suspect secondary given active smoker vs dehydration  -send epo and jak2 in AM  - consider starting ASA 81mg    Problem/Plan - 4:  ·  Problem: Need for other prophylactic measure.   ·  Plan: dvt ppx: lovenox 40mg daily        Differential diagnosis and plan of care discussed with patient after the evaluation. Counseling on diet, nutritional counseling, weight management, exercise and medication compliance was done.   Advanced care planning/advanced directives discussed with patient/family. DNR status including forceful chest compressions to attempt to restart the heart, ventilator support/artificial breathing, electric shock, artificial nutrition, health care proxy, Molst form all discussed with pt. Pt wishes to consider. More than fifteen minutes spent on discussing advanced directives.        Scott England DO [facility]  Cardiovascular Medicine  [address] [place] [address], [address]  Office [phone]  Cell [phone]

## 2022-06-17 LAB
24R-OH-CALCIDIOL SERPL-MCNC: 28.5 NG/ML — LOW (ref 30–80)
ALBUMIN SERPL ELPH-MCNC: 3.6 G/DL — SIGNIFICANT CHANGE UP (ref 3.3–5)
ALP SERPL-CCNC: 139 U/L — HIGH (ref 40–120)
ALT FLD-CCNC: 32 U/L — SIGNIFICANT CHANGE UP (ref 10–45)
ANION GAP SERPL CALC-SCNC: 7 MMOL/L — SIGNIFICANT CHANGE UP (ref 5–17)
AST SERPL-CCNC: 17 U/L — SIGNIFICANT CHANGE UP (ref 10–40)
BASOPHILS # BLD AUTO: 0.02 K/UL — SIGNIFICANT CHANGE UP (ref 0–0.2)
BASOPHILS NFR BLD AUTO: 0.2 % — SIGNIFICANT CHANGE UP (ref 0–2)
BILIRUB SERPL-MCNC: 0.2 MG/DL — SIGNIFICANT CHANGE UP (ref 0.2–1.2)
BUN SERPL-MCNC: 10 MG/DL — SIGNIFICANT CHANGE UP (ref 7–23)
CA-I BLD-SCNC: 1.37 MMOL/L — HIGH (ref 1.15–1.33)
CALCIUM SERPL-MCNC: 10.1 MG/DL — SIGNIFICANT CHANGE UP (ref 8.4–10.5)
CALCIUM SERPL-MCNC: 10.2 MG/DL — SIGNIFICANT CHANGE UP (ref 8.4–10.5)
CHLORIDE SERPL-SCNC: 93 MMOL/L — LOW (ref 96–108)
CO2 SERPL-SCNC: 33 MMOL/L — HIGH (ref 22–31)
CREAT SERPL-MCNC: 0.34 MG/DL — LOW (ref 0.5–1.3)
EGFR: 118 ML/MIN/1.73M2 — SIGNIFICANT CHANGE UP
EOSINOPHIL # BLD AUTO: 0 K/UL — SIGNIFICANT CHANGE UP (ref 0–0.5)
EOSINOPHIL NFR BLD AUTO: 0 % — SIGNIFICANT CHANGE UP (ref 0–6)
GLUCOSE SERPL-MCNC: 126 MG/DL — HIGH (ref 70–99)
HCT VFR BLD CALC: 46.6 % — HIGH (ref 34.5–45)
HCV AB S/CO SERPL IA: 0.11 S/CO — SIGNIFICANT CHANGE UP (ref 0–0.99)
HCV AB SERPL-IMP: SIGNIFICANT CHANGE UP
HGB BLD-MCNC: 15 G/DL — SIGNIFICANT CHANGE UP (ref 11.5–15.5)
IMM GRANULOCYTES NFR BLD AUTO: 1.3 % — SIGNIFICANT CHANGE UP (ref 0–1.5)
LYMPHOCYTES # BLD AUTO: 0.84 K/UL — LOW (ref 1–3.3)
LYMPHOCYTES # BLD AUTO: 8.4 % — LOW (ref 13–44)
MCHC RBC-ENTMCNC: 29.6 PG — SIGNIFICANT CHANGE UP (ref 27–34)
MCHC RBC-ENTMCNC: 32.2 GM/DL — SIGNIFICANT CHANGE UP (ref 32–36)
MCV RBC AUTO: 91.9 FL — SIGNIFICANT CHANGE UP (ref 80–100)
MONOCYTES # BLD AUTO: 0.71 K/UL — SIGNIFICANT CHANGE UP (ref 0–0.9)
MONOCYTES NFR BLD AUTO: 7.1 % — SIGNIFICANT CHANGE UP (ref 2–14)
NEUTROPHILS # BLD AUTO: 8.25 K/UL — HIGH (ref 1.8–7.4)
NEUTROPHILS NFR BLD AUTO: 83 % — HIGH (ref 43–77)
NRBC # BLD: 0 /100 WBCS — SIGNIFICANT CHANGE UP (ref 0–0)
PHOSPHATE SERPL-MCNC: 2.8 MG/DL — SIGNIFICANT CHANGE UP (ref 2.5–4.5)
PLATELET # BLD AUTO: 231 K/UL — SIGNIFICANT CHANGE UP (ref 150–400)
POTASSIUM SERPL-MCNC: 4.7 MMOL/L — SIGNIFICANT CHANGE UP (ref 3.5–5.3)
POTASSIUM SERPL-SCNC: 4.7 MMOL/L — SIGNIFICANT CHANGE UP (ref 3.5–5.3)
PROT SERPL-MCNC: 6.3 G/DL — SIGNIFICANT CHANGE UP (ref 6–8.3)
PTH-INTACT FLD-MCNC: 81 PG/ML — HIGH (ref 15–65)
RBC # BLD: 5.07 M/UL — SIGNIFICANT CHANGE UP (ref 3.8–5.2)
RBC # FLD: 12 % — SIGNIFICANT CHANGE UP (ref 10.3–14.5)
SODIUM SERPL-SCNC: 133 MMOL/L — LOW (ref 135–145)
VIT D25+D1,25 OH+D1,25 PNL SERPL-MCNC: 110 PG/ML — HIGH (ref 19.9–79.3)
WBC # BLD: 9.95 K/UL — SIGNIFICANT CHANGE UP (ref 3.8–10.5)
WBC # FLD AUTO: 9.95 K/UL — SIGNIFICANT CHANGE UP (ref 3.8–10.5)

## 2022-06-17 PROCEDURE — 93306 TTE W/DOPPLER COMPLETE: CPT | Mod: 26

## 2022-06-17 PROCEDURE — 99233 SBSQ HOSP IP/OBS HIGH 50: CPT

## 2022-06-17 PROCEDURE — 71250 CT THORAX DX C-: CPT | Mod: 26

## 2022-06-17 PROCEDURE — 99223 1ST HOSP IP/OBS HIGH 75: CPT | Mod: GC

## 2022-06-17 RX ADMIN — Medication 3 MILLILITER(S): at 00:36

## 2022-06-17 RX ADMIN — ENOXAPARIN SODIUM 40 MILLIGRAM(S): 100 INJECTION SUBCUTANEOUS at 18:26

## 2022-06-17 RX ADMIN — Medication 40 MILLIGRAM(S): at 05:50

## 2022-06-17 RX ADMIN — Medication 3 MILLILITER(S): at 18:27

## 2022-06-17 RX ADMIN — Medication 600 MILLIGRAM(S): at 05:50

## 2022-06-17 RX ADMIN — Medication 3 MILLILITER(S): at 05:51

## 2022-06-17 RX ADMIN — BUDESONIDE AND FORMOTEROL FUMARATE DIHYDRATE 2 PUFF(S): 160; 4.5 AEROSOL RESPIRATORY (INHALATION) at 05:51

## 2022-06-17 RX ADMIN — Medication 3 MILLILITER(S): at 12:10

## 2022-06-17 RX ADMIN — Medication 600 MILLIGRAM(S): at 18:27

## 2022-06-17 NOTE — PROGRESS NOTE ADULT - SUBJECTIVE AND OBJECTIVE BOX
DATE OF SERVICE: 06-17-22 @ 15:36    Patient is a 61y old  Female who presents with a chief complaint of sob (17 Jun 2022 12:27)      INTERVAL HISTORY: Feels better.     REVIEW OF SYSTEMS:  CONSTITUTIONAL: No weakness  EYES/ENT: No visual changes;  No throat pain   NECK: No pain or stiffness  RESPIRATORY: No cough, wheezing; No shortness of breath  CARDIOVASCULAR: No chest pain or palpitations  GASTROINTESTINAL: No abdominal  pain. No nausea, vomiting, or hematemesis  GENITOURINARY: No dysuria, frequency or hematuria  NEUROLOGICAL: No stroke like symptoms  SKIN: No rashes      	  MEDICATIONS:        PHYSICAL EXAM:  T(C): 36.8 (06-17-22 @ 12:09), Max: 37.1 (06-16-22 @ 22:03)  HR: 110 (06-17-22 @ 12:09) (106 - 113)  BP: 115/62 (06-17-22 @ 12:09) (115/62 - 152/90)  RR: 25 (06-17-22 @ 12:09) (24 - 27)  SpO2: 90% (06-17-22 @ 12:09) (90% - 94%)  Wt(kg): --  I&O's Summary    Height (cm): 167.6 (06-16 @ 22:03)  Weight (kg): 53.3 (06-16 @ 22:03)  BMI (kg/m2): 19 (06-16 @ 22:03)  BSA (m2): 1.6 (06-16 @ 22:03)    Appearance: In no distress	  HEENT:    PERRL, EOMI	  Cardiovascular:  S1 S2, No JVD  Respiratory: Lungs clear to auscultation	  Gastrointestinal:  Soft, Non-tender, + BS	  Vascularature:  No edema of LE  Psychiatric: Appropriate affect   Neuro: no acute focal deficits                               15.0   9.95  )-----------( 231      ( 17 Jun 2022 06:38 )             46.6     06-17    133<L>  |  93<L>  |  10  ----------------------------<  126<H>  4.7   |  33<H>  |  0.34<L>    Ca    10.2      17 Jun 2022 06:38  Phos  2.8     06-17    TPro  6.3  /  Alb  3.6  /  TBili  0.2  /  DBili  x   /  AST  17  /  ALT  32  /  AlkPhos  139<H>  06-17        Labs personally reviewed      ASSESSMENT/PLAN: 	    60yo female COPD active smoker p/w 5days sob, cough, URI symptoms admitted for acute respiratory failure with hypoxia and severe sepsis due to pna    Problem/Plan - 1:  ·  Problem: Acute respiratory failure with hypoxia.   ·  Plan: Underlying COPD active smoker not on home 02 was hypoxic to 60-80 at home/ER improved to low 90s with in 2L NC breathing comfortably  -likely due to bacterial pna and copd exacerbation  - abx and steroids as per primary team and pulm    Problem/Plan - 2:  ·  Problem: Abnormal EKG   ·  Plan: findings mostly consistent with known advanced COPD  - hyperacute T waves but normal K and no chest pain, trops negative  - echo, though may be done as OP    Problem/Plan - 3:  ·  Problem: Erythrocytosis.   ·  Plan: hgb 17.7 suspect secondary given active smoker vs dehydration  -send epo and jak2 in AM  - consider starting ASA 81mg    Problem/Plan - 4:  ·  Problem: Need for other prophylactic measure.   ·  Plan: dvt ppx: lovenox 40mg daily        NANCY Posadas-TRINO England DO [facility]  Cardiovascular Medicine  [address] [place] [address], [address]  Office: [phone]  Cell: [phone]

## 2022-06-17 NOTE — CONSULT NOTE ADULT - ASSESSMENT
Graciela Augustin is a 61 year old w/COPD, actively smokingwho p/w 5days worsening sob at rest with productive cough and noted pulse ox at home to be 80's. Daughter who is an RN went to her house and said pt lips were blue and 02 was in low 80s. In ER triage 59% but responded to 2L NC satting low 90s without distress. Went to urgent care 2 days ago and Reports taking amoxicillin and nebulizer at home but did not help.  Pulmonary has been consulted for COPD exacerbation.    #COPD  - PFTs 12/2018 showing obstructive defect w/FEV1/FVC 68% prediceted,FEV1 0.77, and scooping on flow-volume loop\  - on incruse, breo inhalers as outpatient  - presentation consistent with exacerbation, likely 2/2 to pneumonia given symptoms/CXR finding      Recommendations  - check procalcitonin  - can decrease steroids to prednisone 40 mg daily  - continue w/levaquin w/plan for 7  day course for CAP  - wean FiO2 to target SpO2 88-92%  - send sputum cultures, if able  -  discontinue symbicort if as patient is  receiving systemic steroids and duonebs  - please provide and encourage use of incentive spirometry    Note incomplete    To be discussed w/Dr. Angela Valentino   [id number]   Graciela Augustin is a 61 year old w/COPD, actively smokingwho p/w 5days worsening sob at rest with productive cough and noted pulse ox at home to be 80's. Daughter who is an RN went to her house and said pt lips were blue and 02 was in low 80s. In ER triage 59% but responded to 2L NC satting low 90s without distress. Went to urgent care 2 days ago and Reports taking amoxicillin and nebulizer at home but did not help.  Pulmonary has been consulted for COPD exacerbation.    #COPD  - PFTs 12/2018 showing obstructive defect w/FEV1/FVC 68% prediceted,FEV1 0.77, and scooping on flow-volume loop\  - on incruse, breo inhalers as outpatient  - presentation consistent with exacerbation, likely 2/2 to pneumonia given symptoms/CXR finding  - patient w/previous CT scan from 2011 showing bilateral upper lobe cystic changes, R pleural effusion and right-sided consolidation      Recommendations  - would obtain repeat CT chest non-contrast   - check procalcitonin  - can decrease steroids to prednisone 40 mg daily  - continue w/levaquin w/plan for 7  day course for CAP  - wean FiO2 to target SpO2 88-92%  - send sputum cultures, if able  -  discontinue symbicort if as patient is  receiving systemic steroids and duonebs  - please provide and encourage use of incentive spirometry    Note incomplete    To be discussed w/Dr. Angela Valentino   [id number]   Graciela Augustin is a 61 year old w/COPD, actively smokingwho p/w 5days worsening sob at rest with productive cough and noted pulse ox at home to be 80's. Daughter who is an RN went to her house and said pt lips were blue and 02 was in low 80s. In ER triage 59% but responded to 2L NC satting low 90s without distress. Went to urgent care 2 days ago and Reports taking amoxicillin and nebulizer at home but did not help.  Pulmonary has been consulted for COPD exacerbation.    #COPD  - presentation consistent w/likely exacerbation, patient reports symptomatic improvement following antibiotics, steroids, and duonebs   - PFTs 12/2018 showing obstructive defect w/FEV1/FVC 68% prediceted,FEV1 0.77, and scooping on flow-volume loop  - on incruse, breo inhalers as outpatient  - presentation consistent with exacerbation, likely 2/2 to pneumonia given symptoms/CXR finding of R middle lobe consolidation  - patient w/previous CT scan from 2011 showing bilateral upper lobe cystic changes, R pleural effusion and right-sided consolidation      Recommendations  - would obtain repeat CT chest non-contrast  - obtain BNP, TTE, and check pro-calcitonin  - can decrease steroids to prednisone 40 mg daily  - continue duonebs q6h  - continue w/levaquin w/plan for 7  day course for CAP  - wean FiO2 to target SpO2 88-92%  - send sputum cultures, if able  - discontinue symbicort if as patient is receiving systemic steroids and duonebs  - please provide and encourage use of incentive spirometry while inpatient  - ensure patient has PT/OT ordered     Patient seen and discussed w/Dr. Angela Valentino   PGY4 [id number]

## 2022-06-17 NOTE — CONSULT NOTE ADULT - SUBJECTIVE AND OBJECTIVE BOX
CHIEF COMPLAINT: shortness of breath/hypoxia     HPI:    Graciela Augustin is a 61 year old w/COPD, actively smokingwho p/w 5days worsening sob at rest with productive cough and noted pulse ox at home to be 80's. Daughter who is an RN went to her house and said pt lips were blue and 02 was in low 80s. In ER triage 59% but responded to 2L NC satting low 90s without distress. Went to urgent care 2 days ago and Reports taking amoxicillin and nebulizer at home but did not help.  Pulmonary has been consulted for COPD exacerbation.    On exam patient states...    PAST MEDICAL & SURGICAL HISTORY:  History of COPD      No significant past surgical history          FAMILY HISTORY:      SOCIAL HISTORY:  Smoking: [ ] Never Smoked [ ] Former Smoker (__ packs x ___ years) [ x] Current Smoker  (__ packs x ___ years)    Allergies    No Known Allergies    Intolerances        HOME MEDICATIONS:  Home Medications:  albuterol 2.5 mg/3 mL (0.083%) inhalation solution: 3 milliliter(s) inhaled , As Needed (16 Jun 2022 12:50)  Incruse Ellipta 62.5 mcg/inh inhalation powder: 1 puff(s) inhaled every 24 hours (16 Jun 2022 12:50)      REVIEW OF SYSTEMS:  Constitutional: [ ] negative [ ] fevers [ ] chills [ ] weight loss [ ] weight gain  HEENT: [ ] negative [ ] dry eyes [ ] eye irritation [ ] postnasal drip [ ] nasal congestion  CV: [ ] negative  [ ] chest pain [ ] orthopnea [ ] palpitations [ ] murmur  Resp: [ ] negative [ ] cough [ ] shortness of breath [ ] dyspnea [ ] wheezing [ ] sputum [ ] hemoptysis  GI: [ ] negative [ ] nausea [ ] vomiting [ ] diarrhea [ ] constipation [ ] abd pain [ ] dysphagia   : [ ] negative [ ] dysuria [ ] nocturia [ ] hematuria [ ] increased urinary frequency  Musculoskeletal: [ ] negative [ ] back pain [ ] myalgias [ ] arthralgias [ ] fracture  Skin: [ ] negative [ ] rash [ ] itch  Neurological: [ ] negative [ ] headache [ ] dizziness [ ] syncope [ ] weakness [ ] numbness  Psychiatric: [ ] negative [ ] anxiety [ ] depression  Endocrine: [ ] negative [ ] diabetes [ ] thyroid problem  Hematologic/Lymphatic: [ ] negative [ ] anemia [ ] bleeding problem  Allergic/Immunologic: [ ] negative [ ] itchy eyes [ ] nasal discharge [ ] hives [ ] angioedema  [ ] All other systems negative  [ ] Unable to assess ROS because ________    OBJECTIVE:  ICU Vital Signs Last 24 Hrs  T(C): 37 (17 Jun 2022 04:41), Max: 37.1 (16 Jun 2022 22:03)  T(F): 98.6 (17 Jun 2022 04:41), Max: 98.8 (16 Jun 2022 22:03)  HR: 113 (17 Jun 2022 04:41) (106 - 120)  BP: 130/65 (17 Jun 2022 04:41) (118/69 - 172/101)  BP(mean): --  ABP: --  ABP(mean): --  RR: 24 (16 Jun 2022 22:03) (24 - 28)  SpO2: 92% (17 Jun 2022 04:41) (81% - 94%)        CAPILLARY BLOOD GLUCOSE          PHYSICAL EXAM:  General: no acute distress	  HEENT:   Normal oral mucosa, EOMI	  Cardiovascular:  S1 S2, No JVD,    Respiratory: Lungs clear to auscultation	  Psychiatry: Alert  Gastrointestinal:  Soft, Non-tender, + BS	  Skin: No rashes   Neurologic: Non-focal  Extremities:  No edema      LINES:     HOSPITAL MEDICATIONS:  Standing Meds:  albuterol/ipratropium for Nebulization 3 milliLiter(s) Nebulizer every 6 hours  budesonide 160 MICROgram(s)/formoterol 4.5 MICROgram(s) Inhaler 2 Puff(s) Inhalation two times a day  enoxaparin Injectable 40 milliGRAM(s) SubCutaneous every 24 hours  guaiFENesin  milliGRAM(s) Oral every 12 hours  influenza   Vaccine 0.5 milliLiter(s) IntraMuscular once  levoFLOXacin IVPB      levoFLOXacin IVPB 750 milliGRAM(s) IV Intermittent every 24 hours  methylPREDNISolone sodium succinate Injectable 40 milliGRAM(s) IV Push every 12 hours  sodium chloride 0.9%. 1000 milliLiter(s) IV Continuous <Continuous>      PRN Meds:  acetaminophen     Tablet .. 650 milliGRAM(s) Oral every 6 hours PRN  benzonatate 100 milliGRAM(s) Oral three times a day PRN  melatonin 3 milliGRAM(s) Oral at bedtime PRN  ondansetron Injectable 4 milliGRAM(s) IV Push every 8 hours PRN      LABS:                        15.0   9.95  )-----------( 231      ( 17 Jun 2022 06:38 )             46.6     Hgb Trend: 15.0<--, 17.7<--  06-17    133<L>  |  93<L>  |  10  ----------------------------<  126<H>  4.7   |  33<H>  |  0.34<L>    Ca    10.2      17 Jun 2022 06:38  Phos  2.8     06-17    TPro  6.3  /  Alb  3.6  /  TBili  0.2  /  DBili  x   /  AST  17  /  ALT  32  /  AlkPhos  139<H>  06-17    Creatinine Trend: 0.34<--, 0.37<--        Venous Blood Gas:  06-16 @ 14:55  7.30/65/69/32/94.1  VBG Lactate: 1.0      MICROBIOLOGY:     Culture - Sputum (collected 16 Jun 2022 16:06)  Source: .Sputum Sputum  Gram Stain (16 Jun 2022 22:28):    Few polymorphonuclear leukocytes per low power field    No Squamous epithelial cells per low power field    Few Gram positive cocci in pairs per oil power field    Few Gram Variable Rods per oil power field    Rare Yeast like cells per oil power field        RADIOLOGY:  [ ] Reviewed and interpreted by me    PULMONARY FUNCTION TESTS:    EKG:   CHIEF COMPLAINT: shortness of breath/hypoxia     HPI:    Graciela Augustin is a 61 year old w/COPD, actively smokingwho p/w 5days worsening sob at rest with productive cough and noted pulse ox at home to be 80's. Daughter who is an RN went to her house and said pt lips were blue and 02 was in low 80s. In ER triage 59% but responded to 2L NC satting low 90s without distress. Went to urgent care 2 days ago and Reports taking amoxicillin and nebulizer at home but did not help.  Pulmonary has been consulted for COPD exacerbation.    On exam patient states that starting last saturday she began to experience increased sinus congestion as well as post nasal drip.  She says that her symptoms progressed to the point where she developed cough and increased shortness of breath.  She went to an outside provider and received amoxicillin but despite this continued to feel short of breath.  She reports having been intubated approximately 5 years ago for pneumonia and given persistence of symptoms and this history she presented for further evaluation.  She currently feels better compared with her initial presentation.  She states she continues to smoke and reports a 30-35 year pack history of smoking.  She states she has received her pneumococcal vaccines as well as her COVID vaccines.  She also says that she received a CT scan approximately 11 years ago due to concerns that she had TB but denies any known exposure to TB and states she has lived in [place] her whole life.      PAST MEDICAL & SURGICAL HISTORY:  History of COPD      No significant past surgical history          FAMILY HISTORY:      SOCIAL HISTORY:  Smoking: [ ] Never Smoked [ ] Former Smoker (__ packs x ___ years) [ x] Current Smoker  (__ packs x ___ years)    Allergies    No Known Allergies    Intolerances        HOME MEDICATIONS:  Home Medications:  albuterol 2.5 mg/3 mL (0.083%) inhalation solution: 3 milliliter(s) inhaled , As Needed (16 Jun 2022 12:50)  Incruse Ellipta 62.5 mcg/inh inhalation powder: 1 puff(s) inhaled every 24 hours (16 Jun 2022 12:50)      REVIEW OF SYSTEMS:  Constitutional: [ ] negative [ ] fevers [ ] chills [ ] weight loss [ ] weight gain  HEENT: [ ] negative [ ] dry eyes [ ] eye irritation [ ] postnasal drip [ ] nasal congestion  CV: [ ] negative  [ ] chest pain [ ] orthopnea [ ] palpitations [ ] murmur  Resp: [ ] negative [ x] cough [x ] shortness of breath [ ] dyspnea [ ] wheezing [ ] sputum [ ] hemoptysis  GI: [ ] negative [ ] nausea [ ] vomiting [ ] diarrhea [ ] constipation [ ] abd pain [ ] dysphagia   : [ ] negative [ ] dysuria [ ] nocturia [ ] hematuria [ ] increased urinary frequency  Musculoskeletal: [ ] negative [ ] back pain [ ] myalgias [ ] arthralgias [ ] fracture  Skin: [ ] negative [ ] rash [ ] itch  Neurological: [ ] negative [ ] headache [ ] dizziness [ ] syncope [ ] weakness [ ] numbness  Psychiatric: [ ] negative [ ] anxiety [ ] depression  Endocrine: [ ] negative [ ] diabetes [ ] thyroid problem  Hematologic/Lymphatic: [ ] negative [ ] anemia [ ] bleeding problem  Allergic/Immunologic: [ ] negative [ ] itchy eyes [ ] nasal discharge [ ] hives [ ] angioedema  [x ] All other systems negative  [ ] Unable to assess ROS because ________    OBJECTIVE:  ICU Vital Signs Last 24 Hrs  T(C): 37 (17 Jun 2022 04:41), Max: 37.1 (16 Jun 2022 22:03)  T(F): 98.6 (17 Jun 2022 04:41), Max: 98.8 (16 Jun 2022 22:03)  HR: 113 (17 Jun 2022 04:41) (106 - 120)  BP: 130/65 (17 Jun 2022 04:41) (118/69 - 172/101)  BP(mean): --  ABP: --  ABP(mean): --  RR: 24 (16 Jun 2022 22:03) (24 - 28)  SpO2: 92% (17 Jun 2022 04:41) (81% - 94%)        CAPILLARY BLOOD GLUCOSE          PHYSICAL EXAM:  General: no acute distress	  HEENT:   Normal oral mucosa, EOMI	  Cardiovascular:  S1 S2, No JVD,    Respiratory: faint crackles at bases bilaterally but no wheezing or use of accessory muscles  Psychiatry: Alert  Gastrointestinal:  Soft, Non-tender, + BS	  Skin: No rashes   Neurologic: No gross/focal deficits appreciated   Extremities:  No edema, thin      LINES:     HOSPITAL MEDICATIONS:  Standing Meds:  albuterol/ipratropium for Nebulization 3 milliLiter(s) Nebulizer every 6 hours  budesonide 160 MICROgram(s)/formoterol 4.5 MICROgram(s) Inhaler 2 Puff(s) Inhalation two times a day  enoxaparin Injectable 40 milliGRAM(s) SubCutaneous every 24 hours  guaiFENesin  milliGRAM(s) Oral every 12 hours  influenza   Vaccine 0.5 milliLiter(s) IntraMuscular once  levoFLOXacin IVPB      levoFLOXacin IVPB 750 milliGRAM(s) IV Intermittent every 24 hours  methylPREDNISolone sodium succinate Injectable 40 milliGRAM(s) IV Push every 12 hours  sodium chloride 0.9%. 1000 milliLiter(s) IV Continuous <Continuous>      PRN Meds:  acetaminophen     Tablet .. 650 milliGRAM(s) Oral every 6 hours PRN  benzonatate 100 milliGRAM(s) Oral three times a day PRN  melatonin 3 milliGRAM(s) Oral at bedtime PRN  ondansetron Injectable 4 milliGRAM(s) IV Push every 8 hours PRN      LABS:                        15.0   9.95  )-----------( 231      ( 17 Jun 2022 06:38 )             46.6     Hgb Trend: 15.0<--, 17.7<--  06-17    133<L>  |  93<L>  |  10  ----------------------------<  126<H>  4.7   |  33<H>  |  0.34<L>    Ca    10.2      17 Jun 2022 06:38  Phos  2.8     06-17    TPro  6.3  /  Alb  3.6  /  TBili  0.2  /  DBili  x   /  AST  17  /  ALT  32  /  AlkPhos  139<H>  06-17    Creatinine Trend: 0.34<--, 0.37<--        Venous Blood Gas:  06-16 @ 14:55  7.30/65/69/32/94.1  VBG Lactate: 1.0      MICROBIOLOGY:     Culture - Sputum (collected 16 Jun 2022 16:06)  Source: .Sputum Sputum  Gram Stain (16 Jun 2022 22:28):    Few polymorphonuclear leukocytes per low power field    No Squamous epithelial cells per low power field    Few Gram positive cocci in pairs per oil power field    Few Gram Variable Rods per oil power field    Rare Yeast like cells per oil power field        RADIOLOGY:  [ ] Reviewed and interpreted by me    PULMONARY FUNCTION TESTS:    EKG:   CHIEF COMPLAINT: shortness of breath/hypoxia     HPI:    Graciela Augustin is a 61 year old w/COPD, actively smokingwho p/w 5days worsening sob at rest with productive cough and noted pulse ox at home to be 80's. Daughter who is an RN went to her house and said pt lips were blue and 02 was in low 80s. In ER triage 59% but responded to 2L NC satting low 90s without distress. Went to urgent care 2 days ago and Reports taking amoxicillin and nebulizer at home but did not help.  Pulmonary has been consulted for COPD exacerbation.    On exam patient states that starting last saturday she began to experience increased sinus congestion as well as post nasal drip.  She says that her symptoms progressed to the point where she developed cough and increased shortness of breath.  She went to an outside provider and received amoxicillin but despite this continued to feel short of breath.  She reports having been intubated approximately 5 years ago for pneumonia and given persistence of symptoms and this history she presented for further evaluation.  She currently feels better compared with her initial presentation.  She states she continues to smoke and reports a 30-35 year pack history of smoking.  She states she has received her pneumococcal vaccines as well as her COVID vaccines.  She also says that she received a CT scan approximately 11 years ago due to concerns that she had TB but denies any known exposure to TB and states she has lived in [place] her whole life.      PAST MEDICAL & SURGICAL HISTORY:  History of COPD      No significant past surgical history          FAMILY HISTORY:  no history in parents    SOCIAL HISTORY:  Smoking: [ ] Never Smoked [ ] Former Smoker (__ packs x ___ years) [ x] Current Smoker  (__ packs x ___ years)  social etoh    Allergies    No Known Allergies    Intolerances        HOME MEDICATIONS:  Home Medications:  albuterol 2.5 mg/3 mL (0.083%) inhalation solution: 3 milliliter(s) inhaled , As Needed (16 Jun 2022 12:50)  Incruse Ellipta 62.5 mcg/inh inhalation powder: 1 puff(s) inhaled every 24 hours (16 Jun 2022 12:50)      REVIEW OF SYSTEMS:  Constitutional: [ ] negative [x ]no  fevers [x ] no chills [ ] weight loss [ ] weight gain  HEENT: [ ] negative [ ] dry eyes [ ] eye irritation [ ] postnasal drip [ ] nasal congestion  CV: [ ] negative  [x ] no chest pain [x ] no orthopnea [ ] palpitations [ ] murmur  Resp: [ ] negative [ x] cough [x ] shortness of breath [ ] dyspnea [ ] wheezing [ ] sputum [ ] hemoptysis  GI: no n/v/d/c  : [ ] negative [ ] dysuria [ ] nocturia [ ] hematuria [ ] increased urinary frequency  Musculoskeletal: [ ] negative [x ] no back pain [ ] myalgias [ ] arthralgias [ ] fracture  Skin: [ ] negative [ ] rash [ ] itch  Neurological: [ ] negative [ ] headache [ ] dizziness [ ] syncope [ ] weakness [ ] numbness  Psychiatric: [ ] negative [ ] anxiety [ ] depression  Endocrine: [ ] negative [ ] diabetes [ ] thyroid problem  Hematologic/Lymphatic: [ ] negative [ ] anemia [ ] bleeding problem  Allergic/Immunologic: [ ] negative [ ] itchy eyes [ ] nasal discharge [ ] hives [ ] angioedema  [x ] All other systems negative  [ ] Unable to assess ROS because ________    OBJECTIVE:  ICU Vital Signs Last 24 Hrs  T(C): 37 (17 Jun 2022 04:41), Max: 37.1 (16 Jun 2022 22:03)  T(F): 98.6 (17 Jun 2022 04:41), Max: 98.8 (16 Jun 2022 22:03)  HR: 113 (17 Jun 2022 04:41) (106 - 120)  BP: 130/65 (17 Jun 2022 04:41) (118/69 - 172/101)  BP(mean): --  ABP: --  ABP(mean): --  RR: 24 (16 Jun 2022 22:03) (24 - 28)  SpO2: 92% (17 Jun 2022 04:41) (81% - 94%)        CAPILLARY BLOOD GLUCOSE          PHYSICAL EXAM:  General: no acute distress	  HEENT:   Normal oral mucosa, EOMI	  Cardiovascular:  S1 S2, No JVD,  no murmurs  Respiratory: faint crackles at bases bilaterally but no wheezing or use of accessory muscles  Psychiatry: Alert, oriented, normal affect  Gastrointestinal:  Soft, Non-tender, + BS	non distended  Skin: No rashes, normal turgor  Neurologic: No gross/focal deficits appreciated   Extremities:  No edema, thin      LINES:     HOSPITAL MEDICATIONS:  Standing Meds:  albuterol/ipratropium for Nebulization 3 milliLiter(s) Nebulizer every 6 hours  budesonide 160 MICROgram(s)/formoterol 4.5 MICROgram(s) Inhaler 2 Puff(s) Inhalation two times a day  enoxaparin Injectable 40 milliGRAM(s) SubCutaneous every 24 hours  guaiFENesin  milliGRAM(s) Oral every 12 hours  influenza   Vaccine 0.5 milliLiter(s) IntraMuscular once  levoFLOXacin IVPB      levoFLOXacin IVPB 750 milliGRAM(s) IV Intermittent every 24 hours  methylPREDNISolone sodium succinate Injectable 40 milliGRAM(s) IV Push every 12 hours  sodium chloride 0.9%. 1000 milliLiter(s) IV Continuous <Continuous>      PRN Meds:  acetaminophen     Tablet .. 650 milliGRAM(s) Oral every 6 hours PRN  benzonatate 100 milliGRAM(s) Oral three times a day PRN  melatonin 3 milliGRAM(s) Oral at bedtime PRN  ondansetron Injectable 4 milliGRAM(s) IV Push every 8 hours PRN      LABS:                        15.0   9.95  )-----------( 231      ( 17 Jun 2022 06:38 )             46.6     Hgb Trend: 15.0<--, 17.7<--  06-17    133<L>  |  93<L>  |  10  ----------------------------<  126<H>  4.7   |  33<H>  |  0.34<L>    Ca    10.2      17 Jun 2022 06:38  Phos  2.8     06-17    TPro  6.3  /  Alb  3.6  /  TBili  0.2  /  DBili  x   /  AST  17  /  ALT  32  /  AlkPhos  139<H>  06-17    Creatinine Trend: 0.34<--, 0.37<--        Venous Blood Gas:  06-16 @ 14:55  7.30/65/69/32/94.1  VBG Lactate: 1.0      MICROBIOLOGY:     Culture - Sputum (collected 16 Jun 2022 16:06)  Source: .Sputum Sputum  Gram Stain (16 Jun 2022 22:28):    Few polymorphonuclear leukocytes per low power field    No Squamous epithelial cells per low power field    Few Gram positive cocci in pairs per oil power field    Few Gram Variable Rods per oil power field    Rare Yeast like cells per oil power field        RADIOLOGY:  [x ] Reviewed and interpreted by me  cxr: right midlung opacity, ?LLL infiltrate    PULMONARY FUNCTION TESTS:    EKG:

## 2022-06-17 NOTE — CONSULT NOTE ADULT - ATTENDING COMMENTS
61 F with COPD and diastolic dysfunction (HFpEF) here with acute hypoxemic respiratory failure, likely community acquired pneumonia causing COPD exacerbation.    Feels better than on admission.  She is still an active smoker.  Has PFTs that show severe obstruction and low DLCO  Has TTE in past that shows grade II diastolic dysfunction    # acute hypoxemic respiratory failure  # COPD exacerbation  # community acquired pneumonia  - can do prednisone 40 mg po daily x 5 days  - would do standing nebulizers today and eventually switch to Symbicort and spiriva (before resuming home incruse and breo on discharge)  - agree with abx for CAP (check qtc)  - please check sputum culture, blood culture, urine legionella and strep antigen  - recommend CT chest w/o contrast   - counseled on smoking cessation        61 year old w/COPD, actively smokingwho p/w 5days worsening sob at rest with productive cough and noted pulse ox at home to be 80's. Daughter who is an RN went to her house and said pt lips were blue and 02 was in low 80s. In ER triage 59% but responded to 2L NC satting low 90s without distress. Went to urgent care 2 days ago and Reports taking amoxicillin and nebulizer at home but did not help.  Pulmonary has been consulted for COPD exacerbation.

## 2022-06-18 LAB
CULTURE RESULTS: SIGNIFICANT CHANGE UP
NT-PROBNP SERPL-SCNC: 919 PG/ML — HIGH (ref 0–300)
PROCALCITONIN SERPL-MCNC: 0.1 NG/ML — SIGNIFICANT CHANGE UP (ref 0.02–0.1)
SPECIMEN SOURCE: SIGNIFICANT CHANGE UP

## 2022-06-18 PROCEDURE — 99233 SBSQ HOSP IP/OBS HIGH 50: CPT | Mod: GC

## 2022-06-18 RX ADMIN — Medication 600 MILLIGRAM(S): at 17:31

## 2022-06-18 RX ADMIN — Medication 3 MILLILITER(S): at 17:31

## 2022-06-18 RX ADMIN — Medication 40 MILLIGRAM(S): at 05:43

## 2022-06-18 RX ADMIN — Medication 3 MILLILITER(S): at 00:09

## 2022-06-18 RX ADMIN — Medication 100 MILLIGRAM(S): at 22:33

## 2022-06-18 RX ADMIN — Medication 100 MILLIGRAM(S): at 05:43

## 2022-06-18 RX ADMIN — Medication 3 MILLILITER(S): at 05:42

## 2022-06-18 RX ADMIN — Medication 3 MILLILITER(S): at 12:00

## 2022-06-18 RX ADMIN — ENOXAPARIN SODIUM 40 MILLIGRAM(S): 100 INJECTION SUBCUTANEOUS at 18:13

## 2022-06-18 RX ADMIN — Medication 600 MILLIGRAM(S): at 05:42

## 2022-06-18 NOTE — OCCUPATIONAL THERAPY INITIAL EVALUATION ADULT - ANTICIPATED DISCHARGE DISPOSITION, OT EVAL
Home with home OT for home safety evaluation, balance, strength, endurance impacting pt's ability to participate in functional mobility and ADLs.

## 2022-06-18 NOTE — PHYSICAL THERAPY INITIAL EVALUATION ADULT - ADDITIONAL COMMENTS
Pt lives with mother in an apartment +elevator to floor. Pt was amb (I) without an AD and (I) with all ADLs PTA.

## 2022-06-18 NOTE — PROGRESS NOTE ADULT - PROBLEM SELECTOR PLAN 7
mild alk phos and ALT elevated, AST and tbili wnl  suspect related to sepsis  no abd pain  -trend cmp
mild alk phos and ALT elevated, AST and tbili wnl  suspect related to sepsis  no abd pain  -trend cmp

## 2022-06-18 NOTE — PHYSICAL THERAPY INITIAL EVALUATION ADULT - PERTINENT HX OF CURRENT PROBLEM, REHAB EVAL
Pt is a 60 y/o F with PMH: COPD active smoker p/w 5 days worsening SOB at rest +cough. SpO2 at home to be 80's. Daughter who is an RN went to house, pt lips were blue and 02 was in low 80s. Pt is a/w acute respiratory failure +hypoxia +severe sepsis 2/2 PNA. Hospital Course: in ED triage, tachycardic to 119, SpO2 59% on RA, but responded to 2LNC in low 90's. XRAY CHEST (6/16): c/w PNA. CT CHEST (6/17): B/L patchy groundglass opacities/consolidations ? 2/2 PNA. Coronary artery calcifications.

## 2022-06-18 NOTE — PROGRESS NOTE ADULT - SUBJECTIVE AND OBJECTIVE BOX
Matt Rizvi MD  Division of Hospital Medicine  Available via MS teams  If no response or off hours page: [phone]  ---------------------------------------------------------    JOAQUÍN MCRAE  61y  Female      Patient is a 61y old  Female who presents with a chief complaint of sob (17 Jun 2022 15:36)      INTERVAL HPI/OVERNIGHT EVENTS:  States breathing is improving. Is tired      REVIEW OF SYSTEMS: 10 point ROS negative unless listed above    T(C): 36.8 (06-18-22 @ 05:12), Max: 36.8 (06-17-22 @ 12:09)  HR: 117 (06-18-22 @ 05:12) (110 - 117)  BP: 129/72 (06-18-22 @ 05:12) (115/62 - 129/72)  RR: 20 (06-18-22 @ 05:12) (20 - 25)  SpO2: 94% (06-18-22 @ 05:12) (90% - 100%)  Wt(kg): --Vital Signs Last 24 Hrs  T(C): 36.8 (18 Jun 2022 05:12), Max: 36.8 (17 Jun 2022 12:09)  T(F): 98.3 (18 Jun 2022 05:12), Max: 98.3 (17 Jun 2022 12:09)  HR: 117 (18 Jun 2022 05:12) (110 - 117)  BP: 129/72 (18 Jun 2022 05:12) (115/62 - 129/72)  BP(mean): --  RR: 20 (18 Jun 2022 05:12) (20 - 25)  SpO2: 94% (18 Jun 2022 05:12) (90% - 100%)    PHYSICAL EXAM:  CONSTITUTIONAL: NAD, well-developed, well-groomed  RESPIRATORY: Normal respiratory effort; Lungs clear   CARDIOVASCULAR: Regular rate and rhythm, normal S1 and S2, no murmur; No lower extremity edema  ABDOMEN: Nontender to palpation, normoactive bowel sounds, no rebound/guarding  MUSCULOSKELETAL: no clubbing or cyanosis of digits; no joint swelling or tenderness to palpation  PSYCH: A+O to person, place, and time; affect appropriate  SKIN: No rashes; no palpable lesions    Consultant(s) Notes Reviewed:  [x ] YES  [ ] NO  Care Discussed with Consultants/Other Providers [ x] YES  [ ] NO    LABS:                        15.0   9.95  )-----------( 231      ( 17 Jun 2022 06:38 )             46.6     06-17    133<L>  |  93<L>  |  10  ----------------------------<  126<H>  4.7   |  33<H>  |  0.34<L>    Ca    10.2      17 Jun 2022 06:38  Phos  2.8     06-17    TPro  6.3  /  Alb  3.6  /  TBili  0.2  /  DBili  x   /  AST  17  /  ALT  32  /  AlkPhos  139<H>  06-17        CAPILLARY BLOOD GLUCOSE                RADIOLOGY & ADDITIONAL TESTS:    Imaging Personally Reviewed:  [ ] YES  [ ] NO

## 2022-06-18 NOTE — PROGRESS NOTE ADULT - ASSESSMENT
60yo female COPD active smoker p/w 5days sob, cough, URI symptoms admitted for acute respiratory failure with hypoxia and severe sepsis due to pna

## 2022-06-18 NOTE — PHYSICAL THERAPY INITIAL EVALUATION ADULT - PLANNED THERAPY INTERVENTIONS, PT EVAL
GOAL: Pt will ascend/descend 3 steps (I) with U HR and step over step pattern in 4 weeks./balance training/gait training/strengthening/transfer training

## 2022-06-18 NOTE — OCCUPATIONAL THERAPY INITIAL EVALUATION ADULT - ADDITIONAL COMMENTS
Went to urgent care 2 days ago and Reports taking amoxicillin and nebulizer at home but did not help. Reports feeling much better after ER treatment without any further sob.  +associated headache, nasal congestion for past few days.Boosted x 1 for covid with [employer] and also reports taken pneumovax vaccine a few years ago. Her daughter tested positive for covid 2 weeks ago and she has not seen her since. Reports being intubated in past for bacterial pna.      CT Chest: Tracheal secretions.Bilateral patchy groundglass opacities and consolidations possibly secondary to pneumonia.Biapical lung scarring.

## 2022-06-18 NOTE — PHYSICAL THERAPY INITIAL EVALUATION ADULT - STRENGTHENING, PT EVAL
GOAL: Pt will improve bilateral LE strength by 1 grade, for increased limb stability, to improve gait and facilitate stair negotiation in 4 weeks.

## 2022-06-18 NOTE — OCCUPATIONAL THERAPY INITIAL EVALUATION ADULT - DIAGNOSIS, OT EVAL
Pt demonstrates decreased strength, balance, activity tolerance and endurance impacting pt's ability to participate in functional mobility and ADLs.

## 2022-06-18 NOTE — OCCUPATIONAL THERAPY INITIAL EVALUATION ADULT - PERTINENT HX OF CURRENT PROBLEM, REHAB EVAL
60yo female COPD active smoker p/w 5days worsening sob at rest with productive cough and noted pulse ox at home to be 80's. Daughter who is an RN went to her house and said pt lips were blue and 02 was in low 80s. In ER triage 59% but responded to 2L NC satting low 90s without distress.

## 2022-06-19 DIAGNOSIS — J44.1 CHRONIC OBSTRUCTIVE PULMONARY DISEASE WITH (ACUTE) EXACERBATION: ICD-10-CM

## 2022-06-19 PROCEDURE — 99233 SBSQ HOSP IP/OBS HIGH 50: CPT

## 2022-06-19 RX ADMIN — Medication 600 MILLIGRAM(S): at 05:50

## 2022-06-19 RX ADMIN — Medication 3 MILLILITER(S): at 12:14

## 2022-06-19 RX ADMIN — Medication 650 MILLIGRAM(S): at 03:51

## 2022-06-19 RX ADMIN — Medication 3 MILLILITER(S): at 23:51

## 2022-06-19 RX ADMIN — Medication 3 MILLILITER(S): at 00:24

## 2022-06-19 RX ADMIN — Medication 600 MILLIGRAM(S): at 17:31

## 2022-06-19 RX ADMIN — ENOXAPARIN SODIUM 40 MILLIGRAM(S): 100 INJECTION SUBCUTANEOUS at 18:06

## 2022-06-19 RX ADMIN — Medication 40 MILLIGRAM(S): at 05:49

## 2022-06-19 RX ADMIN — Medication 3 MILLILITER(S): at 17:32

## 2022-06-19 RX ADMIN — Medication 3 MILLILITER(S): at 05:50

## 2022-06-19 NOTE — PROGRESS NOTE ADULT - SUBJECTIVE AND OBJECTIVE BOX
Mohsin Khan, MD  Attending Physician, Division Of Hospital Medicine  Pager: [phone], Office: [phone]  Off hour pager: [phone]    Patient is a 61y old  Female who presents with a chief complaint of SOB    SUBJECTIVE / OVERNIGHT EVENTS:  Seen, examined the patient this am  Sitting in bed, no acute SOB but still SOB on exertion, afebrile, O2 sat 93% on 3L    MEDICATIONS  (STANDING):  albuterol/ipratropium for Nebulization 3 milliLiter(s) Nebulizer every 6 hours  enoxaparin Injectable 40 milliGRAM(s) SubCutaneous every 24 hours  guaiFENesin  milliGRAM(s) Oral every 12 hours  influenza   Vaccine 0.5 milliLiter(s) IntraMuscular once  levoFLOXacin IVPB      levoFLOXacin IVPB 750 milliGRAM(s) IV Intermittent every 24 hours  predniSONE   Tablet 40 milliGRAM(s) Oral daily  sodium chloride 0.9%. 1000 milliLiter(s) (60 mL/Hr) IV Continuous <Continuous>    MEDICATIONS  (PRN):  acetaminophen     Tablet .. 650 milliGRAM(s) Oral every 6 hours PRN Temp greater or equal to 38C (100.4F), Mild Pain (1 - 3)  benzonatate 100 milliGRAM(s) Oral three times a day PRN Cough  melatonin 3 milliGRAM(s) Oral at bedtime PRN Insomnia  ondansetron Injectable 4 milliGRAM(s) IV Push every 8 hours PRN Nausea and/or Vomiting      Vital Signs Last 24 Hrs  T(C): 37 (19 Jun 2022 05:15), Max: 37 (19 Jun 2022 05:15)  T(F): 98.6 (19 Jun 2022 05:15), Max: 98.6 (19 Jun 2022 05:15)  HR: 105 (19 Jun 2022 05:15) (105 - 119)  BP: 142/79 (19 Jun 2022 05:15) (119/67 - 142/79)  BP(mean): --  RR: 20 (19 Jun 2022 05:15) (20 - 20)  SpO2: 92% (19 Jun 2022 05:15) (92% - 95%)  CAPILLARY BLOOD GLUCOSE        I&O's Summary      PHYSICAL EXAM:-  GENERAL: NAD, well-developed  EYES: EOMI, PERRLA, conjunctiva and sclera clear  NECK: Supple, No JVD, no thyromegaly  CHEST/LUNG: upper lung rhonchi on auscultation bilaterally; No wheeze  HEART: Regular rate and rhythm; S1, S2 audible, No murmurs, rubs, or gallops  ABDOMEN: Soft, Nontender, Nondistended; Bowel sounds present  EXTREMITIES:  2+ Peripheral Pulses, No clubbing, cyanosis, or edema  NEURO: AAOx3, no focal deficit      LABS:        RADIOLOGY & ADDITIONAL TESTS:    Imaging Personally Reviewed: CXR, CT chest  Consultant(s) Notes Reviewed: Pulmonary

## 2022-06-20 ENCOUNTER — TRANSCRIPTION ENCOUNTER (OUTPATIENT)
Age: 62
End: 2022-06-20

## 2022-06-20 LAB
ANION GAP SERPL CALC-SCNC: 10 MMOL/L — SIGNIFICANT CHANGE UP (ref 5–17)
BUN SERPL-MCNC: 9 MG/DL — SIGNIFICANT CHANGE UP (ref 7–23)
CALCIUM SERPL-MCNC: 10.4 MG/DL — SIGNIFICANT CHANGE UP (ref 8.4–10.5)
CHLORIDE SERPL-SCNC: 90 MMOL/L — LOW (ref 96–108)
CO2 SERPL-SCNC: 35 MMOL/L — HIGH (ref 22–31)
CREAT SERPL-MCNC: 0.37 MG/DL — LOW (ref 0.5–1.3)
EGFR: 116 ML/MIN/1.73M2 — SIGNIFICANT CHANGE UP
GLUCOSE SERPL-MCNC: 83 MG/DL — SIGNIFICANT CHANGE UP (ref 70–99)
HCT VFR BLD CALC: 50.1 % — HIGH (ref 34.5–45)
HGB BLD-MCNC: 16.1 G/DL — HIGH (ref 11.5–15.5)
MCHC RBC-ENTMCNC: 29.7 PG — SIGNIFICANT CHANGE UP (ref 27–34)
MCHC RBC-ENTMCNC: 32.1 GM/DL — SIGNIFICANT CHANGE UP (ref 32–36)
MCV RBC AUTO: 92.3 FL — SIGNIFICANT CHANGE UP (ref 80–100)
NRBC # BLD: 0 /100 WBCS — SIGNIFICANT CHANGE UP (ref 0–0)
PLATELET # BLD AUTO: 247 K/UL — SIGNIFICANT CHANGE UP (ref 150–400)
POTASSIUM SERPL-MCNC: 5 MMOL/L — SIGNIFICANT CHANGE UP (ref 3.5–5.3)
POTASSIUM SERPL-SCNC: 5 MMOL/L — SIGNIFICANT CHANGE UP (ref 3.5–5.3)
RBC # BLD: 5.43 M/UL — HIGH (ref 3.8–5.2)
RBC # FLD: 11.9 % — SIGNIFICANT CHANGE UP (ref 10.3–14.5)
SODIUM SERPL-SCNC: 135 MMOL/L — SIGNIFICANT CHANGE UP (ref 135–145)
WBC # BLD: 11.1 K/UL — HIGH (ref 3.8–10.5)
WBC # FLD AUTO: 11.1 K/UL — HIGH (ref 3.8–10.5)

## 2022-06-20 PROCEDURE — 99233 SBSQ HOSP IP/OBS HIGH 50: CPT

## 2022-06-20 PROCEDURE — 99233 SBSQ HOSP IP/OBS HIGH 50: CPT | Mod: GC

## 2022-06-20 RX ADMIN — Medication 3 MILLILITER(S): at 05:49

## 2022-06-20 RX ADMIN — ENOXAPARIN SODIUM 40 MILLIGRAM(S): 100 INJECTION SUBCUTANEOUS at 17:47

## 2022-06-20 RX ADMIN — Medication 40 MILLIGRAM(S): at 05:50

## 2022-06-20 RX ADMIN — Medication 3 MILLILITER(S): at 17:47

## 2022-06-20 RX ADMIN — Medication 3 MILLILITER(S): at 12:05

## 2022-06-20 NOTE — PROGRESS NOTE ADULT - SUBJECTIVE AND OBJECTIVE BOX
CHIEF COMPLAINT: shortness of breath/hypoxia     Interval events:  - patient seen and examined this AM,     PAST MEDICAL & SURGICAL HISTORY:  History of COPD      No significant past surgical history          FAMILY HISTORY:  no history in parents    SOCIAL HISTORY:  Smoking: [ ] Never Smoked [ ] Former Smoker (__ packs x ___ years) [ x] Current Smoker  (__ packs x ___ years)  social etoh    Allergies    No Known Allergies    Intolerances        HOME MEDICATIONS:  Home Medications:  albuterol 2.5 mg/3 mL (0.083%) inhalation solution: 3 milliliter(s) inhaled , As Needed (16 Jun 2022 12:50)  Incruse Ellipta 62.5 mcg/inh inhalation powder: 1 puff(s) inhaled every 24 hours (16 Jun 2022 12:50)      REVIEW OF SYSTEMS:  Constitutional: [ ] negative [x ]no  fevers [x ] no chills [ ] weight loss [ ] weight gain  HEENT: [ ] negative [ ] dry eyes [ ] eye irritation [ ] postnasal drip [ ] nasal congestion  CV: [ ] negative  [x ] no chest pain [x ] no orthopnea [ ] palpitations [ ] murmur  Resp: [ ] negative [ x] cough [x ] shortness of breath [ ] dyspnea [ ] wheezing [ ] sputum [ ] hemoptysis  GI: no n/v/d/c  : [ ] negative [ ] dysuria [ ] nocturia [ ] hematuria [ ] increased urinary frequency  Musculoskeletal: [ ] negative [x ] no back pain [ ] myalgias [ ] arthralgias [ ] fracture  Skin: [ ] negative [ ] rash [ ] itch  Neurological: [ ] negative [ ] headache [ ] dizziness [ ] syncope [ ] weakness [ ] numbness  Psychiatric: [ ] negative [ ] anxiety [ ] depression  Endocrine: [ ] negative [ ] diabetes [ ] thyroid problem  Hematologic/Lymphatic: [ ] negative [ ] anemia [ ] bleeding problem  Allergic/Immunologic: [ ] negative [ ] itchy eyes [ ] nasal discharge [ ] hives [ ] angioedema  [x ] All other systems negative  [ ] Unable to assess ROS because ________    OBJECTIVE:  ICU Vital Signs Last 24 Hrs  T(C): 37 (17 Jun 2022 04:41), Max: 37.1 (16 Jun 2022 22:03)  T(F): 98.6 (17 Jun 2022 04:41), Max: 98.8 (16 Jun 2022 22:03)  HR: 113 (17 Jun 2022 04:41) (106 - 120)  BP: 130/65 (17 Jun 2022 04:41) (118/69 - 172/101)  BP(mean): --  ABP: --  ABP(mean): --  RR: 24 (16 Jun 2022 22:03) (24 - 28)  SpO2: 92% (17 Jun 2022 04:41) (81% - 94%)        CAPILLARY BLOOD GLUCOSE          PHYSICAL EXAM:  General: no acute distress	  HEENT:   Normal oral mucosa, EOMI	  Cardiovascular:  S1 S2, No JVD,  no murmurs  Respiratory: faint crackles at bases bilaterally but no wheezing or use of accessory muscles  Psychiatry: Alert, oriented, normal affect  Gastrointestinal:  Soft, Non-tender, + BS	non distended  Skin: No rashes, normal turgor  Neurologic: No gross/focal deficits appreciated   Extremities:  No edema, thin      LINES:     HOSPITAL MEDICATIONS:  Standing Meds:  albuterol/ipratropium for Nebulization 3 milliLiter(s) Nebulizer every 6 hours  budesonide 160 MICROgram(s)/formoterol 4.5 MICROgram(s) Inhaler 2 Puff(s) Inhalation two times a day  enoxaparin Injectable 40 milliGRAM(s) SubCutaneous every 24 hours  guaiFENesin  milliGRAM(s) Oral every 12 hours  influenza   Vaccine 0.5 milliLiter(s) IntraMuscular once  levoFLOXacin IVPB      levoFLOXacin IVPB 750 milliGRAM(s) IV Intermittent every 24 hours  methylPREDNISolone sodium succinate Injectable 40 milliGRAM(s) IV Push every 12 hours  sodium chloride 0.9%. 1000 milliLiter(s) IV Continuous <Continuous>      PRN Meds:  acetaminophen     Tablet .. 650 milliGRAM(s) Oral every 6 hours PRN  benzonatate 100 milliGRAM(s) Oral three times a day PRN  melatonin 3 milliGRAM(s) Oral at bedtime PRN  ondansetron Injectable 4 milliGRAM(s) IV Push every 8 hours PRN      LABS:                        15.0   9.95  )-----------( 231      ( 17 Jun 2022 06:38 )             46.6     Hgb Trend: 15.0<--, 17.7<--  06-17    133<L>  |  93<L>  |  10  ----------------------------<  126<H>  4.7   |  33<H>  |  0.34<L>    Ca    10.2      17 Jun 2022 06:38  Phos  2.8     06-17    TPro  6.3  /  Alb  3.6  /  TBili  0.2  /  DBili  x   /  AST  17  /  ALT  32  /  AlkPhos  139<H>  06-17    Creatinine Trend: 0.34<--, 0.37<--        Venous Blood Gas:  06-16 @ 14:55  7.30/65/69/32/94.1  VBG Lactate: 1.0      MICROBIOLOGY:     Culture - Sputum (collected 16 Jun 2022 16:06)  Source: .Sputum Sputum  Gram Stain (16 Jun 2022 22:28):    Few polymorphonuclear leukocytes per low power field    No Squamous epithelial cells per low power field    Few Gram positive cocci in pairs per oil power field    Few Gram Variable Rods per oil power field    Rare Yeast like cells per oil power field        RADIOLOGY:  [x ] Reviewed and interpreted by me  cxr: right midlung opacity, ?LLL infiltrate    PULMONARY FUNCTION TESTS:    EKG:

## 2022-06-20 NOTE — DISCHARGE NOTE PROVIDER - NSDCFUSCHEDAPPT_GEN_ALL_CORE_FT
Yanira Putnam  [facility]  [facility] [address] [place] [address]  Scheduled Appointment: 07/12/2022

## 2022-06-20 NOTE — PROGRESS NOTE ADULT - ASSESSMENT
Graciela Augustin is a 61 year old w/COPD, actively smokingwho p/w 5days worsening sob at rest with productive cough and noted pulse ox at home to be 80's. Daughter who is an RN went to her house and said pt lips were blue and 02 was in low 80s. In ER triage 59% but responded to 2L NC satting low 90s without distress. Went to urgent care 2 days ago and Reports taking amoxicillin and nebulizer at home but did not help.  Pulmonary has been consulted for COPD exacerbation.      #Hypoxic respiratory failure  - suspect etiology 2/2 to pneumonia resulting in COPD exacerbation, also concern for volume overload given TTE w/evidence of diastoilc dysfunction, elevated BNP     #COPD  - presentation consistent w/likely exacerbation, patient reports symptomatic improvement following antibiotics, steroids, and duonebs   - PFTs 12/2018 showing obstructive defect w/FEV1/FVC 68% prediceted,FEV1 0.77, and scooping on flow-volume loop  - on incruse, breo inhalers as outpatient  - presentation consistent with exacerbation, likely 2/2 to pneumonia given symptoms/CXR finding of R middle lobe consolidation  - patient w/previous CT scan from 2011 showing bilateral upper lobe cystic changes, R pleural effusion and right-sided consolidation  - repeat CT chest 6/17/22 demonstrating bilateral upper lobe GGO  - sputum 6/16 growing normal respiratory mathieu        Recommendations  - would give lasix 20 mg IV 1x and monitor for response   - can decrease steroids to prednisone 40 mg daily, would plan for a total of 5 days of therapy   - continue duonebs q6h  - continue w/levaquin w/plan for 7  day course for CAP  - wean FiO2 to target SpO2 88-92%  - send sputum cultures, if able  - discontinue symbicort if as patient is receiving systemic steroids and duonebs  - please provide and encourage use of incentive spirometry while inpatient  - ensure patient has PT/OT ordered     Note incomplete    To be discussed w/Dr. Soraida Valentino   [id number]

## 2022-06-20 NOTE — DISCHARGE NOTE PROVIDER - CARE PROVIDER_API CALL
Yanira Putnam)  Critical Care Medicine; Pulmonary Disease  [address] [place] [address]  Phone: [phone]  Fax: [phone]  Follow Up Time: 2 weeks

## 2022-06-20 NOTE — PROGRESS NOTE ADULT - ASSESSMENT
60yo female COPD active smoker p/w 5days sob, cough, URI symptoms admitted for acute respiratory failure with hypoxia and severe sepsis due to copd exacerabion

## 2022-06-20 NOTE — PROGRESS NOTE ADULT - ASSESSMENT
Graciela Augustin is a 61 year old w/COPD, actively smokingwho p/w 5days worsening sob at rest with productive cough and noted pulse ox at home to be 80's. Daughter who is an RN went to her house and said pt lips were blue and 02 was in low 80s. In ER triage 59% but responded to 2L NC satting low 90s without distress. Went to urgent care 2 days ago and Reports taking amoxicillin and nebulizer at home but did not help.  Pulmonary has been consulted for COPD exacerbation.      #Hypoxic respiratory failure--improving   - suspect etiology 2/2 to pneumonia resulting in COPD exacerbation, also concern for volume overload given TTE w/evidence of diastoilc dysfunction, elevated BNP     #COPD  - presentation consistent w/likely exacerbation, patient reports symptomatic improvement following antibiotics, steroids, and duonebs   - PFTs 12/2018 showing obstructive defect w/FEV1/FVC 68% prediceted,FEV1 0.77, and scooping on flow-volume loop  - on incruse, breo inhalers as outpatient  - presentation consistent with exacerbation, likely 2/2 to pneumonia given symptoms/CXR finding of R middle lobe consolidation  - patient w/previous CT scan from 2011 showing bilateral upper lobe cystic changes, R pleural effusion and right-sided consolidation  - repeat CT chest 6/17/22 demonstrating bilateral upper lobe GGO  - sputum 6/16 growing normal respiratory mathieu    Recommendations  - please send for quantiferon gold (patient does not have to stay inpatient for results to return)  - can continue prednisone 40 mg daily for total of 5 days   - continue duonebs q6h  - continue w/levaquin w/plan for 7  day course for CAP  - wean FiO2 to target SpO2 88-92%  - please provide and encourage use of incentive spirometry while inpatient  - ensure patient has PT/OT ordered   - please alert pulmonary at time of discharge so that outpatient follow up can be arranged    Patient seen and discussed  w/Dr. Soraida Valentino   [id number]

## 2022-06-20 NOTE — PROGRESS NOTE ADULT - SUBJECTIVE AND OBJECTIVE BOX
CHIEF COMPLAINT: shortness of breath/hypoxia     Interval events:  - patient seen and examined this AM, reports significant improvement in symptoms compared with admission    PAST MEDICAL & SURGICAL HISTORY:  History of COPD      No significant past surgical history          FAMILY HISTORY:  no history in parents    SOCIAL HISTORY:  Smoking: [ ] Never Smoked [ ] Former Smoker (__ packs x ___ years) [ x] Current Smoker  (__ packs x ___ years)  social etoh    Allergies    No Known Allergies    Intolerances        HOME MEDICATIONS:  Home Medications:  albuterol 2.5 mg/3 mL (0.083%) inhalation solution: 3 milliliter(s) inhaled , As Needed (16 Jun 2022 12:50)  Incruse Ellipta 62.5 mcg/inh inhalation powder: 1 puff(s) inhaled every 24 hours (16 Jun 2022 12:50)      REVIEW OF SYSTEMS:  Constitutional: [ ] negative [x ]no  fevers [x ] no chills [ ] weight loss [ ] weight gain  HEENT: [ ] negative [ ] dry eyes [ ] eye irritation [ ] postnasal drip [ ] nasal congestion  CV: [ ] negative  [x ] no chest pain [x ] no orthopnea [ ] palpitations [ ] murmur  Resp: [ ] negative [ x] cough [x ] shortness of breath [ ] dyspnea [ ] wheezing [ ] sputum [ ] hemoptysis  GI: no n/v/d/c  : [ ] negative [ ] dysuria [ ] nocturia [ ] hematuria [ ] increased urinary frequency  Musculoskeletal: [ ] negative [x ] no back pain [ ] myalgias [ ] arthralgias [ ] fracture  Skin: [ ] negative [ ] rash [ ] itch  Neurological: [ ] negative [ ] headache [ ] dizziness [ ] syncope [ ] weakness [ ] numbness  Psychiatric: [ ] negative [ ] anxiety [ ] depression  Endocrine: [ ] negative [ ] diabetes [ ] thyroid problem  Hematologic/Lymphatic: [ ] negative [ ] anemia [ ] bleeding problem  Allergic/Immunologic: [ ] negative [ ] itchy eyes [ ] nasal discharge [ ] hives [ ] angioedema  [x ] All other systems negative  [ ] Unable to assess ROS because ________    OBJECTIVE:  ICU Vital Signs Last 24 Hrs  T(C): 37 (17 Jun 2022 04:41), Max: 37.1 (16 Jun 2022 22:03)  T(F): 98.6 (17 Jun 2022 04:41), Max: 98.8 (16 Jun 2022 22:03)  HR: 113 (17 Jun 2022 04:41) (106 - 120)  BP: 130/65 (17 Jun 2022 04:41) (118/69 - 172/101)  BP(mean): --  ABP: --  ABP(mean): --  RR: 24 (16 Jun 2022 22:03) (24 - 28)  SpO2: 92% (17 Jun 2022 04:41) (81% - 94%)        CAPILLARY BLOOD GLUCOSE          PHYSICAL EXAM:  General: no acute distress	  HEENT:   Normal oral mucosa, EOMI	  Cardiovascular:  S1 S2, No JVD,  no murmurs  Respiratory: faint crackles at bases bilaterally but no wheezing or use of accessory muscles  Psychiatry: Alert, oriented, normal affect  Gastrointestinal:  Soft, Non-tender, + BS	non distended  Skin: No rashes, normal turgor  Neurologic: No gross/focal deficits appreciated   Extremities:  No edema, thin      LINES:     HOSPITAL MEDICATIONS:  Standing Meds:  albuterol/ipratropium for Nebulization 3 milliLiter(s) Nebulizer every 6 hours  budesonide 160 MICROgram(s)/formoterol 4.5 MICROgram(s) Inhaler 2 Puff(s) Inhalation two times a day  enoxaparin Injectable 40 milliGRAM(s) SubCutaneous every 24 hours  guaiFENesin  milliGRAM(s) Oral every 12 hours  influenza   Vaccine 0.5 milliLiter(s) IntraMuscular once  levoFLOXacin IVPB      levoFLOXacin IVPB 750 milliGRAM(s) IV Intermittent every 24 hours  methylPREDNISolone sodium succinate Injectable 40 milliGRAM(s) IV Push every 12 hours  sodium chloride 0.9%. 1000 milliLiter(s) IV Continuous <Continuous>      PRN Meds:  acetaminophen     Tablet .. 650 milliGRAM(s) Oral every 6 hours PRN  benzonatate 100 milliGRAM(s) Oral three times a day PRN  melatonin 3 milliGRAM(s) Oral at bedtime PRN  ondansetron Injectable 4 milliGRAM(s) IV Push every 8 hours PRN      LABS:                        15.0   9.95  )-----------( 231      ( 17 Jun 2022 06:38 )             46.6     Hgb Trend: 15.0<--, 17.7<--  06-17    133<L>  |  93<L>  |  10  ----------------------------<  126<H>  4.7   |  33<H>  |  0.34<L>    Ca    10.2      17 Jun 2022 06:38  Phos  2.8     06-17    TPro  6.3  /  Alb  3.6  /  TBili  0.2  /  DBili  x   /  AST  17  /  ALT  32  /  AlkPhos  139<H>  06-17    Creatinine Trend: 0.34<--, 0.37<--        Venous Blood Gas:  06-16 @ 14:55  7.30/65/69/32/94.1  VBG Lactate: 1.0      MICROBIOLOGY:     Culture - Sputum (collected 16 Jun 2022 16:06)  Source: .Sputum Sputum  Gram Stain (16 Jun 2022 22:28):    Few polymorphonuclear leukocytes per low power field    No Squamous epithelial cells per low power field    Few Gram positive cocci in pairs per oil power field    Few Gram Variable Rods per oil power field    Rare Yeast like cells per oil power field        RADIOLOGY:  [x ] Reviewed and interpreted by me  cxr: right midlung opacity, ?LLL infiltrate    PULMONARY FUNCTION TESTS:    EKG:

## 2022-06-20 NOTE — PROGRESS NOTE ADULT - SUBJECTIVE AND OBJECTIVE BOX
DATE OF SERVICE: 06-20-22 @ 15:26    Patient is a 61y old  Female who presents with a chief complaint of sob (20 Jun 2022 14:32)      INTERVAL HISTORY: Feels ok.     REVIEW OF SYSTEMS:  CONSTITUTIONAL: No weakness  EYES/ENT: No visual changes;  No throat pain   NECK: No pain or stiffness  RESPIRATORY: No cough, wheezing; No shortness of breath  CARDIOVASCULAR: No chest pain or palpitations  GASTROINTESTINAL: No abdominal  pain. No nausea, vomiting, or hematemesis  GENITOURINARY: No dysuria, frequency or hematuria  NEUROLOGICAL: No stroke like symptoms  SKIN: No rashes    	  MEDICATIONS:        PHYSICAL EXAM:  T(C): 36.6 (06-20-22 @ 13:16), Max: 36.8 (06-19-22 @ 21:58)  HR: 103 (06-20-22 @ 13:16) (99 - 105)  BP: 105/57 (06-20-22 @ 13:16) (105/57 - 126/73)  RR: 19 (06-20-22 @ 13:16) (19 - 20)  SpO2: 92% (06-20-22 @ 13:16) (90% - 94%)  Wt(kg): --  I&O's Summary        Appearance: In no distress	  HEENT:    PERRL, EOMI	  Cardiovascular:  S1 S2, No JVD  Respiratory: B/L wheeze/rhonchi	  Gastrointestinal:  Soft, Non-tender, + BS	  Vascularature:  No edema of LE  Psychiatric: Appropriate affect   Neuro: no acute focal deficits                               16.1   11.10 )-----------( 247      ( 20 Jun 2022 07:24 )             50.1     06-20    135  |  90<L>  |  9   ----------------------------<  83  5.0   |  35<H>  |  0.37<L>    Ca    10.4      20 Jun 2022 07:21          Labs personally reviewed  e< from: Transthoracic Echocardiogram (06.17.22 @ 15:00) >  EF (Visual Estimate): 70-75 %  Doppler Peak Velocity (m/sec): AoV=1.9  ------------------------------------------------------------------------  Observations:  Mitral Valve: Normal mitral valve. Mild mitral  regurgitation.  Aortic Valve/Aorta: Normal trileaflet aortic valve. Peak  transaortic valve gradient equals 14 mm Hg, mean  transaortic valve gradient equals 8 mm Hg, aortic valve  velocity time integral equals 36 cm, estimated aortic valve  area equals 2.3 sqcm. Peak left ventricular outflow tract  gradient equals 9 mm Hg, mean gradient is equal to 5 mm Hg,  LVOT velocity time integral equals 27 cm.  Aortic Root: 3.3 cm.  Left Atrium: Mildly dilated left atrium.  LA volume index =  41 cc/m2.  Left Ventricle: Hyperdynamic left ventricular systolic  function. Normal left ventricular internal dimensions and  wall thicknesses. Moderate diastolic dysfunction (Stage  II).  Right Heart: Normal right atrium. Normal right ventricular  size and systolic function. Normal tricuspid valve. Mild  tricuspid regurgitation. Normal pulmonic valve.  Pericardium/Pleura: Normal pericardium with no pericardial  effusion.  Hemodynamic: Estimated right atrial pressure is 8 mm Hg.  Estimated right ventricular systolic pressure equals 49 mm  Hg, assuming right atrial pressure equals 8 mm Hg,  consistent with mild pulmonary hypertension. Color Doppler  demonstrates no evidence of a patent foramen ovale.  ------------------------------------------------------------------------  Conclusions:  1. Normal left ventricular internal dimensions and wall  thicknesses.  2. Hyperdynamic left ventricular systolic function.  3. Moderate diastolic dysfunction (Stage II).  4. Normal right ventricular size and systolic function.  5. Estimated pulmonary artery systolic pressure equals 49  mm Hg, assuming right atrial pressure equals 8 mm Hg,  consistent with mild pulmonary pressures.      ASSESSMENT/PLAN: 	  62yo female COPD active smoker p/w 5days sob, cough, URI symptoms admitted for acute respiratory failure with hypoxia and severe sepsis due to pna    Problem/Plan - 1:  ·  Problem: Acute respiratory failure with hypoxia.   ·  Plan: Underlying COPD active smoker not on home 02 was hypoxic to 60-80 at home/ER improved to low 90s with in 2L NC breathing comfortably  -likely due to bacterial pna and copd exacerbation  - abx and steroids as per primary team and pulm  - Desaturated to 88% with ambulation. IS encouraged.  - Awaiting re-trial of ambulation to assess need for home O2.     Problem/Plan - 2:  ·  Problem: Abnormal EKG   ·  Plan: findings mostly consistent with known advanced COPD  - hyperacute T waves but normal K and no chest pain, trops negative  - TTE shows EF 70-75%, normal LV systolic function, normal RV size and function, mod diastolic dysfunction, mild pulm pressures    Problem/Plan - 3:  ·  Problem: Erythrocytosis.   ·  Plan: hgb 17.7 suspect secondary given active smoker vs dehydration  -send epo and jak2 in AM  - consider starting ASA 81mg    Problem/Plan - 4:  ·  Problem: Need for other prophylactic measure.   ·  Plan: dvt ppx: lovenox 40mg daily          NANCY Posadas-NP   Scott England DO [facility]  Cardiovascular Medicine  [address], [address]  Office: [phone]  Cell: [phone]

## 2022-06-20 NOTE — DISCHARGE NOTE PROVIDER - NSDCMRMEDTOKEN_GEN_ALL_CORE_FT
albuterol 2.5 mg/3 mL (0.083%) inhalation solution: 3 milliliter(s) inhaled , As Needed  Incruse Ellipta 62.5 mcg/inh inhalation powder: 1 puff(s) inhaled every 24 hours   albuterol 2.5 mg/3 mL (0.083%) inhalation solution: 3 milliliter(s) inhaled , As Needed  aspirin 81 mg oral delayed release tablet: 1 tab(s) orally once a day  Incruse Ellipta 62.5 mcg/inh inhalation powder: 1 puff(s) inhaled every 24 hours

## 2022-06-20 NOTE — DISCHARGE NOTE PROVIDER - HOSPITAL COURSE
To be done. 62yo female COPD active smoker p/w 5days sob, cough, URI symptoms admitted for acute respiratory failure with hypoxia and severe sepsis due to copd exacerabion      Problem/Plan - 1:  ·  Problem: Acute respiratory failure with hypoxia.   ·  Plan: - Due to COPD and Upper lobes PNA, has acute hypoxic respiratory failure on O2  - Improving on Bronchodilators  - c/w IV anbx Levaquin for total 7 day course last day 6/21  - c/w Prednisone 40mg/d for total 5 days last day 6/22  - Blood and sputum c/s neg  - will require home O2 on discharge.     Problem/Plan - 2:  ·  Problem: COPD exacerbation.   ·  Plan: - Acute hypoxic rsp. failure, COPD exacerbation is due to Pna  - Improving on Bronchodilators, IV anbx (Levaquin), Prednisone 40mg/d   - Blood and sputum c/s neg  - appreciated Pulmonary plan.     Problem/Plan - 3:  ·  Problem: Severe sepsis.   ·  Plan: - Initially admitted with sepsis due to PNA  - afebrile, not septic now  - Treatment as above.     Problem/Plan - 4:  ·  Problem: Abnormal EKG.   ·  Plan: - No chest pain, EKG- ST, tall T wave, normal K+, Normal Trop  - TTE EF 70%, normal LV function with grade II diastolic disfunction  - appreciated Cardiology consult, outpatient f/u.     Problem/Plan - 5:  ·  Problem: Hypercalcemia.   ·  Plan: Likely from dehydration  - resolved w IVF.     Problem/Plan - 6:  ·  Problem: Need for other prophylactic measure.   ·  Plan: dvt ppx: lovenox 40mg daily    Dispo- Home, reports daughter will pick her up .

## 2022-06-20 NOTE — PROGRESS NOTE ADULT - SUBJECTIVE AND OBJECTIVE BOX
Patient is a 61y old  Female who presents with a chief complaint of sob (20 Jun 2022 10:54)      SUBJECTIVE / OVERNIGHT EVENTS: feels better, denies cp, sob, abd pain, chills     MEDICATIONS  (STANDING):  albuterol/ipratropium for Nebulization 3 milliLiter(s) Nebulizer every 6 hours  enoxaparin Injectable 40 milliGRAM(s) SubCutaneous every 24 hours  influenza   Vaccine 0.5 milliLiter(s) IntraMuscular once  levoFLOXacin IVPB      levoFLOXacin IVPB 750 milliGRAM(s) IV Intermittent every 24 hours  predniSONE   Tablet 40 milliGRAM(s) Oral daily  sodium chloride 0.9%. 1000 milliLiter(s) (60 mL/Hr) IV Continuous <Continuous>    MEDICATIONS  (PRN):  acetaminophen     Tablet .. 650 milliGRAM(s) Oral every 6 hours PRN Temp greater or equal to 38C (100.4F), Mild Pain (1 - 3)  benzonatate 100 milliGRAM(s) Oral three times a day PRN Cough  melatonin 3 milliGRAM(s) Oral at bedtime PRN Insomnia  ondansetron Injectable 4 milliGRAM(s) IV Push every 8 hours PRN Nausea and/or Vomiting        CAPILLARY BLOOD GLUCOSE        I&O's Summary      PHYSICAL EXAM:  GENERAL: NAD, well-developed  HEAD:  Atraumatic, Normocephalic  EYES:  conjunctiva and sclera clear  NECK: Supple, No JVD  CHEST/LUNG: Clear to auscultation bilaterally; No wheeze  HEART: Regular rate and rhythm; No murmurs, rubs, or gallops  ABDOMEN: Soft, Nontender, Nondistended; Bowel sounds present  EXTREMITIES:  2+ Peripheral Pulses, No clubbing, cyanosis, or edema  PSYCH: AAOx3    LABS:                        16.1   11.10 )-----------( 247      ( 20 Jun 2022 07:24 )             50.1     06-20    135  |  90<L>  |  9   ----------------------------<  83  5.0   |  35<H>  |  0.37<L>    Ca    10.4      20 Jun 2022 07:21                RADIOLOGY & ADDITIONAL TESTS:    Imaging Personally Reviewed:    Consultant(s) Notes Reviewed:      Care Discussed with Consultants/Other Providers:

## 2022-06-21 ENCOUNTER — NON-APPOINTMENT (OUTPATIENT)
Age: 62
End: 2022-06-21

## 2022-06-21 ENCOUNTER — TRANSCRIPTION ENCOUNTER (OUTPATIENT)
Age: 62
End: 2022-06-21

## 2022-06-21 PROBLEM — Z87.09 PERSONAL HISTORY OF OTHER DISEASES OF THE RESPIRATORY SYSTEM: Chronic | Status: ACTIVE | Noted: 2022-06-16

## 2022-06-21 LAB
CULTURE RESULTS: SIGNIFICANT CHANGE UP
SPECIMEN SOURCE: SIGNIFICANT CHANGE UP

## 2022-06-21 PROCEDURE — 99233 SBSQ HOSP IP/OBS HIGH 50: CPT

## 2022-06-21 PROCEDURE — 99232 SBSQ HOSP IP/OBS MODERATE 35: CPT | Mod: GC

## 2022-06-21 RX ADMIN — Medication 40 MILLIGRAM(S): at 05:45

## 2022-06-21 RX ADMIN — Medication 3 MILLILITER(S): at 17:42

## 2022-06-21 RX ADMIN — Medication 3 MILLILITER(S): at 05:45

## 2022-06-21 RX ADMIN — Medication 3 MILLILITER(S): at 12:01

## 2022-06-21 RX ADMIN — ENOXAPARIN SODIUM 40 MILLIGRAM(S): 100 INJECTION SUBCUTANEOUS at 17:42

## 2022-06-21 RX ADMIN — Medication 3 MILLILITER(S): at 23:45

## 2022-06-21 RX ADMIN — Medication 3 MILLILITER(S): at 00:09

## 2022-06-21 NOTE — PROGRESS NOTE ADULT - ATTENDING COMMENTS
Acute hypoxemic respiratory failure due to COPD exacerbation in this current smoker. CXR shows RML pneumonia. slowly improving .  Continue solumedrol 40 mg q 12 hrs.  Continue bronchodilators.  Consider adding Acapella valve for airway clearance.  Finish 7 day course of Levaquin    CT shows calcified pleura suggestive of old infection. Pt has no recollection previous pulmonary infection. No h/o pneumonia or TB.  agree wit sending QuantiFeron gold study.      Nicotine addiction. Pt has not had any nicotine replacement in hospital. doing ok. Continue to monitor.
Acute hypoxemic respiratory failure due to COPD exacerbation in this current smoker. CXR shows RML pneumonia. slowly improving .  Continue solumedrol 40 mg q 12 hrs.  Continue bronchodilators.  Consider adding Acapella valve for airway clearance.  Finish 7 day course of Levaquin    CT shows calcified pleura suggestive of old infection. Pt has no recollection previous pulmonary infection. No h/o pneumonia or TB.  agree wit sending QuantiFeron gold study.      Nicotine addiction. Pt has not had any nicotine replacement in hospital. doing ok. Continue to monitor. Encourage to go to smoking cessation at tobacco center.
Acute hypoxemic respiratory failure due to COPD exacerbation in this current smoker. CXR shows RML pneumonia. slowly improving .  Continue solumedrol 40 mg q 12 hrs.  Continue bronchodilators.  Consider adding Acapella valve for airway clearance.  Finish 7 day course of Levaquin    CT shows calcified pleura suggestive of old infection. Pt has no recollection previous pulmonary infection. No h/o pneumonia or TB.  agree wit sending QuantiFeron gold study.      Nicotine addiction. Pt has not had any nicotine replacement in hospital. doing ok. Continue to monitor.

## 2022-06-21 NOTE — DISCHARGE NOTE NURSING/CASE MANAGEMENT/SOCIAL WORK - NSDCPEEMAIL_GEN_ALL_CORE
[facility] Tobacco [facility] email [email or web address]

## 2022-06-21 NOTE — PROGRESS NOTE ADULT - ASSESSMENT
Graciela Augustin is a 61 year old w/COPD, actively smokingwho p/w 5days worsening sob at rest with productive cough and noted pulse ox at home to be 80's. Daughter who is an RN went to her house and said pt lips were blue and 02 was in low 80s. In ER triage 59% but responded to 2L NC satting low 90s without distress. Went to urgent care 2 days ago and Reports taking amoxicillin and nebulizer at home but did not help.  Pulmonary has been consulted for COPD exacerbation.      #Hypoxic respiratory failure--improving   - suspect etiology 2/2 to pneumonia resulting in COPD exacerbation, also concern for volume overload given TTE w/evidence of diastoilc dysfunction, elevated BNP     #COPD  - presentation consistent w/likely exacerbation, patient reports symptomatic improvement following antibiotics, steroids, and duonebs   - PFTs 12/2018 showing obstructive defect w/FEV1/FVC 68% prediceted,FEV1 0.77, and scooping on flow-volume loop  - on incruse, breo inhalers as outpatient  - presentation consistent with exacerbation, likely 2/2 to pneumonia given symptoms/CXR finding of R middle lobe consolidation  - patient w/previous CT scan from 2011 showing bilateral upper lobe cystic changes, R pleural effusion and right-sided consolidation  - repeat CT chest 6/17/22 demonstrating bilateral upper lobe GGO  - sputum 6/16 growing normal respiratory mathieu    Recommendations  - f/u quantiferon gold (patient does not have to stay inpatient for results to return)  - okay to discharge from pulmonary perspective   - can continue prednisone 40 mg daily for total of 5 days   - continue duonebs q6h  - continue w/levaquin w/plan for 7  day course for CAP  - wean FiO2 to target SpO2 88-92%  - please provide and encourage use of incentive spirometry while inpatient  - ensure patient has PT/OT ordered   - please alert pulmonary at time of discharge so that outpatient follow up can be arranged    Note incomplete    Patient to be discussed  w/Dr. Soraida Valentino   [id number]   Graciela Augustin is a 61 year old w/COPD, actively smokingwho p/w 5days worsening sob at rest with productive cough and noted pulse ox at home to be 80's. Daughter who is an RN went to her house and said pt lips were blue and 02 was in low 80s. In ER triage 59% but responded to 2L NC satting low 90s without distress. Went to urgent care 2 days ago and Reports taking amoxicillin and nebulizer at home but did not help.  Pulmonary has been consulted for COPD exacerbation.      #Hypoxic respiratory failure--improving   - suspect etiology 2/2 to pneumonia resulting in COPD exacerbation, also concern for volume overload given TTE w/evidence of diastoilc dysfunction, elevated BNP     #COPD  - presentation consistent w/likely exacerbation, patient reports symptomatic improvement following antibiotics, steroids, and duonebs   - PFTs 12/2018 showing obstructive defect w/FEV1/FVC 68% prediceted,FEV1 0.77, and scooping on flow-volume loop  - on incruse, breo inhalers as outpatient  - presentation consistent with exacerbation, likely 2/2 to pneumonia given symptoms/CXR finding of R middle lobe consolidation  - patient w/previous CT scan from 2011 showing bilateral upper lobe cystic changes, R pleural effusion and right-sided consolidation  - repeat CT chest 6/17/22 demonstrating bilateral upper lobe GGO  - sputum 6/16 growing normal respiratory mathieu    Recommendations  - f/u quantiferon gold (patient does not have to stay inpatient for results to return)  - okay to discharge from pulmonary perspective  - can continue prednisone 40 mg daily for total of 5 days   - continue duonebs q6h while inpatient, please ensure patient is discharged with albuterol PRN as well as LAMA and LABA/ICS (spiriva and symbicort, for example)  - continue w/levaquin w/plan for 7  day course for CAP  - wean FiO2 to target SpO2 88-92%  - please provide and encourage use of incentive spirometry while inpatient  - ensure patient has PT/OT ordered   - pulmonary to assist with arranging outpatient follow up     Pulmonary to sign off at this point, please re-consult with further questions.    Patient seen and discussed w/Dr. Geovanny Valentino   [id number]   Graciela Augustin is a 61 year old w/COPD, actively smokingwho p/w 5days worsening sob at rest with productive cough and noted pulse ox at home to be 80's. Daughter who is an RN went to her house and said pt lips were blue and 02 was in low 80s. In ER triage 59% but responded to 2L NC satting low 90s without distress. Went to urgent care 2 days ago and Reports taking amoxicillin and nebulizer at home but did not help.  Pulmonary has been consulted for COPD exacerbation.      #Hypoxic respiratory failure--improving   - suspect etiology 2/2 to pneumonia resulting in COPD exacerbation, also concern for volume overload given TTE w/evidence of diastoilc dysfunction, elevated BNP     #COPD  - presentation consistent w/likely exacerbation, patient reports symptomatic improvement following antibiotics, steroids, and duonebs   - PFTs 12/2018 showing obstructive defect w/FEV1/FVC 68% prediceted,FEV1 0.77, and scooping on flow-volume loop  - on incruse, breo inhalers as outpatient  - presentation consistent with exacerbation, likely 2/2 to pneumonia given symptoms/CXR finding of R middle lobe consolidation  - patient w/previous CT scan from 2011 showing bilateral upper lobe cystic changes, R pleural effusion and right-sided consolidation  - repeat CT chest 6/17/22 demonstrating bilateral upper lobe GGO  - sputum 6/16 growing normal respiratory mathieu    Recommendations  - f/u quantiferon gold (patient does not have to stay inpatient for results to return)  - okay to discharge from pulmonary perspective, please evaluate for resting/ambulatory O2 requirements and ensure patient has home O2 arranged prior to discharge if necessary  - can continue prednisone 40 mg daily for total of 5 days   - continue duonebs q6h while inpatient, please ensure patient is discharged with albuterol PRN as well as LAMA and LABA/ICS (spiriva and symbicort, for example)  - continue w/levaquin w/plan for 7  day course for CAP  - wean FiO2 to target SpO2 88-92%  - please provide and encourage use of incentive spirometry while inpatient  - ensure patient has PT/OT ordered   - pulmonary to assist with arranging outpatient follow up     Pulmonary to sign off at this point, please re-consult with further questions.    Patient seen and discussed w/Dr. Geovanny Valentino   [id number]   soft/nondistended/POSITIVE FOR GUARDING/tender...

## 2022-06-21 NOTE — PROGRESS NOTE ADULT - SUBJECTIVE AND OBJECTIVE BOX
Patient is a 61y old  Female who presents with a chief complaint of sob (21 Jun 2022 07:50)      SUBJECTIVE / OVERNIGHT EVENTS: feels better, no cp, sob, abd pain, chills     MEDICATIONS  (STANDING):  albuterol/ipratropium for Nebulization 3 milliLiter(s) Nebulizer every 6 hours  enoxaparin Injectable 40 milliGRAM(s) SubCutaneous every 24 hours  influenza   Vaccine 0.5 milliLiter(s) IntraMuscular once  levoFLOXacin IVPB      levoFLOXacin IVPB 750 milliGRAM(s) IV Intermittent every 24 hours  predniSONE   Tablet 40 milliGRAM(s) Oral daily  sodium chloride 0.9%. 1000 milliLiter(s) (60 mL/Hr) IV Continuous <Continuous>    MEDICATIONS  (PRN):  acetaminophen     Tablet .. 650 milliGRAM(s) Oral every 6 hours PRN Temp greater or equal to 38C (100.4F), Mild Pain (1 - 3)  benzonatate 100 milliGRAM(s) Oral three times a day PRN Cough  melatonin 3 milliGRAM(s) Oral at bedtime PRN Insomnia  ondansetron Injectable 4 milliGRAM(s) IV Push every 8 hours PRN Nausea and/or Vomiting        CAPILLARY BLOOD GLUCOSE        I&O's Summary    21 Jun 2022 07:01  -  21 Jun 2022 14:29  --------------------------------------------------------  IN: 500 mL / OUT: 0 mL / NET: 500 mL        PHYSICAL EXAM:  GENERAL: NAD, well-developed  HEAD:  Atraumatic, Normocephalic  EYES: EOMI, PERRLA, conjunctiva and sclera clear  NECK: Supple, No JVD  CHEST/LUNG: Clear to auscultation bilaterally; No wheeze  HEART: Regular rate and rhythm; No murmurs, rubs, or gallops  ABDOMEN: Soft, Nontender, Nondistended; Bowel sounds present  EXTREMITIES:  2+ Peripheral Pulses, No clubbing, cyanosis, or edema  PSYCH: AAOx3      LABS:                        16.1   11.10 )-----------( 247      ( 20 Jun 2022 07:24 )             50.1     06-20    135  |  90<L>  |  9   ----------------------------<  83  5.0   |  35<H>  |  0.37<L>    Ca    10.4      20 Jun 2022 07:21                RADIOLOGY & ADDITIONAL TESTS:    Imaging Personally Reviewed:    Consultant(s) Notes Reviewed:      Care Discussed with Consultants/Other Providers:

## 2022-06-21 NOTE — PROGRESS NOTE ADULT - SUBJECTIVE AND OBJECTIVE BOX
Date of Service   06-21-22 @ 15:44    Patient is a 61y old  Female who presents with a chief complaint of sob (21 Jun 2022 14:19)      INTERVAL HISTORY: pt feels much better today     REVIEW OF SYSTEMS:   CONSTITUTIONAL: No weakness  EYES/ENT: No visual changes; No throat pain  Neck: No pain or stiffness  Respiratory: No cough, wheezing, No shortness of breath  CARDIOVASCULAR: no chest pain or palpitations  GASTROINTESTINAL: No abdominal pain, no nausea, vomiting or hematemesis  GENITOURINARY: No dysuria, frequency or hematuria  NEUROLOGICAL: No stroke like symptoms  SKIN: No rashes    	  MEDICATIONS:        PHYSICAL EXAM:  T(C): 36.6 (06-21-22 @ 12:56), Max: 37.1 (06-20-22 @ 22:07)  HR: 97 (06-21-22 @ 12:56) (90 - 98)  BP: 120/70 (06-21-22 @ 12:56) (113/64 - 134/83)  RR: 20 (06-21-22 @ 12:56) (18 - 20)  SpO2: 94% (06-21-22 @ 12:56) (85% - 95%)  Wt(kg): --  I&O's Summary    21 Jun 2022 07:01  -  21 Jun 2022 15:44  --------------------------------------------------------  IN: 500 mL / OUT: 0 mL / NET: 500 mL          Appearance: In no distress	  HEENT:    PERRL, EOMI	  Cardiovascular:  S1 S2, No JVD  Respiratory: Lungs clear to auscultation	  Gastrointestinal:  Soft, Non-tender, + BS	  Vasculature:  No edema of LE  Psychiatric: Appropriate affect   Neuro: no acute focal deficits                               16.1   11.10 )-----------( 247      ( 20 Jun 2022 07:24 )             50.1     06-20    135  |  90<L>  |  9   ----------------------------<  83  5.0   |  35<H>  |  0.37<L>    Ca    10.4      20 Jun 2022 07:21          Labs personally reviewed      ASSESSMENT/PLAN: 	  62yo female COPD active smoker p/w 5days sob, cough, URI symptoms admitted for acute respiratory failure with hypoxia and severe sepsis due to pna    Problem/Plan - 1:  ·  Problem: Acute respiratory failure with hypoxia.   ·  Plan: Underlying COPD active smoker not on home 02 was hypoxic to 60-80 at home/ER improved to low 90s with in 2L NC breathing comfortably  -likely due to bacterial pna and copd exacerbation  - abx and steroids as per primary team and pulm  - Desaturated to 88% with ambulation. IS encouraged.  - sat >92 on 1 liter  NC of O2   - Awaiting re-trial of ambulation to assess need for home O2.     Problem/Plan - 2:  ·  Problem: Abnormal EKG   ·  Plan: findings mostly consistent with known advanced COPD  - hyperacute T waves but normal K and no chest pain, trops negative  - TTE shows EF 70-75%, normal LV systolic function, normal RV size and function, mod diastolic dysfunction, mild pulm pressures  - would check lipid panel with am labs   - consider starting ASA 81mg for primary prevention     Problem/Plan - 3:  ·  Problem: Erythrocytosis.   ·  Plan: hgb 17.7 suspect secondary given active smoker vs dehydration  - consider starting ASA 81mg    Problem/Plan - 4:  ·  Problem: Need for other prophylactic measure.   ·  Plan: dvt ppx: lovenox 40mg daily          Dayan BOLAND-BC   Scott England DO [facility]  Cardiovascular Medicine  [address] [place] [address], [address]  Office: [phone]

## 2022-06-21 NOTE — CHART NOTE - NSCHARTNOTEFT_GEN_A_CORE
Patient presents with PNA / COPD exacerbation. Acute exacerbation is currently resolved and patient will require 2L of continuous oxygen at home for COPD. Room air at rest oxygen saturation is 88%. Room air oxygen saturation on ambulation is 85%. Oxygen saturation on 2L on ambulation is 90%

## 2022-06-21 NOTE — DISCHARGE NOTE NURSING/CASE MANAGEMENT/SOCIAL WORK - PATIENT PORTAL LINK FT
You can access the FollowMyHealth Patient Portal offered by [facility] by registering at the following website: [email or web address]. By joining [employer]’s FollowMyHealth portal, you will also be able to view your health information using other applications (apps) compatible with our system.

## 2022-06-21 NOTE — DISCHARGE NOTE NURSING/CASE MANAGEMENT/SOCIAL WORK - NSDCPEWEB_GEN_ALL_CORE
[facility] for Tobacco Control website --- [email or web address] --- [email or web address]

## 2022-06-21 NOTE — DISCHARGE NOTE NURSING/CASE MANAGEMENT/SOCIAL WORK - NSDCPEFALRISK_GEN_ALL_CORE
For information on Fall & Injury Prevention, visit: [email or web address] OR  [email or web address] OR  https://www.cdc.gov/steadi/patient.html

## 2022-06-22 VITALS
SYSTOLIC BLOOD PRESSURE: 112 MMHG | TEMPERATURE: 99 F | HEART RATE: 100 BPM | RESPIRATION RATE: 18 BRPM | OXYGEN SATURATION: 93 % | DIASTOLIC BLOOD PRESSURE: 70 MMHG

## 2022-06-22 LAB
GAMMA INTERFERON BACKGROUND BLD IA-ACNC: 0.03 IU/ML — SIGNIFICANT CHANGE UP
M TB IFN-G BLD-IMP: NEGATIVE — SIGNIFICANT CHANGE UP
M TB IFN-G CD4+ BCKGRND COR BLD-ACNC: -0.01 IU/ML — SIGNIFICANT CHANGE UP
M TB IFN-G CD4+CD8+ BCKGRND COR BLD-ACNC: -0.01 IU/ML — SIGNIFICANT CHANGE UP
QUANT TB PLUS MITOGEN MINUS NIL: 1.34 IU/ML — SIGNIFICANT CHANGE UP

## 2022-06-22 PROCEDURE — 83970 ASSAY OF PARATHORMONE: CPT

## 2022-06-22 PROCEDURE — 82803 BLOOD GASES ANY COMBINATION: CPT

## 2022-06-22 PROCEDURE — 82306 VITAMIN D 25 HYDROXY: CPT

## 2022-06-22 PROCEDURE — 36415 COLL VENOUS BLD VENIPUNCTURE: CPT

## 2022-06-22 PROCEDURE — 84100 ASSAY OF PHOSPHORUS: CPT

## 2022-06-22 PROCEDURE — 93306 TTE W/DOPPLER COMPLETE: CPT

## 2022-06-22 PROCEDURE — 94640 AIRWAY INHALATION TREATMENT: CPT

## 2022-06-22 PROCEDURE — 0225U NFCT DS DNA&RNA 21 SARSCOV2: CPT

## 2022-06-22 PROCEDURE — 97166 OT EVAL MOD COMPLEX 45 MIN: CPT

## 2022-06-22 PROCEDURE — 82652 VIT D 1 25-DIHYDROXY: CPT

## 2022-06-22 PROCEDURE — 82310 ASSAY OF CALCIUM: CPT

## 2022-06-22 PROCEDURE — 80053 COMPREHEN METABOLIC PANEL: CPT

## 2022-06-22 PROCEDURE — 83605 ASSAY OF LACTIC ACID: CPT

## 2022-06-22 PROCEDURE — 82947 ASSAY GLUCOSE BLOOD QUANT: CPT

## 2022-06-22 PROCEDURE — 84443 ASSAY THYROID STIM HORMONE: CPT

## 2022-06-22 PROCEDURE — 85014 HEMATOCRIT: CPT

## 2022-06-22 PROCEDURE — 84145 PROCALCITONIN (PCT): CPT

## 2022-06-22 PROCEDURE — 99285 EMERGENCY DEPT VISIT HI MDM: CPT | Mod: 25

## 2022-06-22 PROCEDURE — 82435 ASSAY OF BLOOD CHLORIDE: CPT

## 2022-06-22 PROCEDURE — 80048 BASIC METABOLIC PNL TOTAL CA: CPT

## 2022-06-22 PROCEDURE — 84484 ASSAY OF TROPONIN QUANT: CPT

## 2022-06-22 PROCEDURE — 93005 ELECTROCARDIOGRAM TRACING: CPT

## 2022-06-22 PROCEDURE — 87040 BLOOD CULTURE FOR BACTERIA: CPT

## 2022-06-22 PROCEDURE — 86803 HEPATITIS C AB TEST: CPT

## 2022-06-22 PROCEDURE — 71250 CT THORAX DX C-: CPT

## 2022-06-22 PROCEDURE — 83880 ASSAY OF NATRIURETIC PEPTIDE: CPT

## 2022-06-22 PROCEDURE — 85018 HEMOGLOBIN: CPT

## 2022-06-22 PROCEDURE — 84132 ASSAY OF SERUM POTASSIUM: CPT

## 2022-06-22 PROCEDURE — 84295 ASSAY OF SERUM SODIUM: CPT

## 2022-06-22 PROCEDURE — 97116 GAIT TRAINING THERAPY: CPT

## 2022-06-22 PROCEDURE — 82330 ASSAY OF CALCIUM: CPT

## 2022-06-22 PROCEDURE — 87070 CULTURE OTHR SPECIMN AEROBIC: CPT

## 2022-06-22 PROCEDURE — 71045 X-RAY EXAM CHEST 1 VIEW: CPT

## 2022-06-22 PROCEDURE — 86480 TB TEST CELL IMMUN MEASURE: CPT

## 2022-06-22 PROCEDURE — 99239 HOSP IP/OBS DSCHRG MGMT >30: CPT

## 2022-06-22 PROCEDURE — 85027 COMPLETE CBC AUTOMATED: CPT

## 2022-06-22 PROCEDURE — 97112 NEUROMUSCULAR REEDUCATION: CPT

## 2022-06-22 PROCEDURE — 97161 PT EVAL LOW COMPLEX 20 MIN: CPT

## 2022-06-22 PROCEDURE — 85025 COMPLETE CBC W/AUTO DIFF WBC: CPT

## 2022-06-22 RX ORDER — ASPIRIN/CALCIUM CARB/MAGNESIUM 324 MG
81 TABLET ORAL DAILY
Refills: 0 | Status: DISCONTINUED | OUTPATIENT
Start: 2022-06-22 | End: 2022-06-22

## 2022-06-22 RX ORDER — ASPIRIN/CALCIUM CARB/MAGNESIUM 324 MG
1 TABLET ORAL
Qty: 30 | Refills: 0
Start: 2022-06-22 | End: 2022-07-21

## 2022-06-22 RX ADMIN — Medication 3 MILLILITER(S): at 12:30

## 2022-06-22 RX ADMIN — Medication 40 MILLIGRAM(S): at 05:16

## 2022-06-22 RX ADMIN — Medication 3 MILLILITER(S): at 05:16

## 2022-06-22 RX ADMIN — Medication 81 MILLIGRAM(S): at 12:30

## 2022-06-22 NOTE — PROGRESS NOTE ADULT - SUBJECTIVE AND OBJECTIVE BOX
Patient is a 61y old  Female who presents with a chief complaint of sob (22 Jun 2022 11:57)      SUBJECTIVE / OVERNIGHT EVENTS: feels better, no cp, sob, abd pain    MEDICATIONS  (STANDING):  albuterol/ipratropium for Nebulization 3 milliLiter(s) Nebulizer every 6 hours  aspirin enteric coated 81 milliGRAM(s) Oral daily  enoxaparin Injectable 40 milliGRAM(s) SubCutaneous every 24 hours  influenza   Vaccine 0.5 milliLiter(s) IntraMuscular once  sodium chloride 0.9%. 1000 milliLiter(s) (60 mL/Hr) IV Continuous <Continuous>    MEDICATIONS  (PRN):  acetaminophen     Tablet .. 650 milliGRAM(s) Oral every 6 hours PRN Temp greater or equal to 38C (100.4F), Mild Pain (1 - 3)  benzonatate 100 milliGRAM(s) Oral three times a day PRN Cough  melatonin 3 milliGRAM(s) Oral at bedtime PRN Insomnia  ondansetron Injectable 4 milliGRAM(s) IV Push every 8 hours PRN Nausea and/or Vomiting        CAPILLARY BLOOD GLUCOSE        I&O's Summary    21 Jun 2022 07:01  -  22 Jun 2022 07:00  --------------------------------------------------------  IN: 500 mL / OUT: 0 mL / NET: 500 mL    22 Jun 2022 07:01  -  22 Jun 2022 12:29  --------------------------------------------------------  IN: 240 mL / OUT: 0 mL / NET: 240 mL        PHYSICAL EXAM:  GENERAL: NAD, well-developed  HEAD:  Atraumatic, Normocephalic  EYES: EOMI, PERRLA, conjunctiva and sclera clear  NECK: Supple, No JVD  CHEST/LUNG: Clear to auscultation bilaterally; No wheeze  HEART: Regular rate and rhythm; No murmurs, rubs, or gallops  ABDOMEN: Soft, Nontender, Nondistended; Bowel sounds present  EXTREMITIES:  2+ Peripheral Pulses, No clubbing, cyanosis, or edema  PSYCH: [id number]    LABS:                    RADIOLOGY & ADDITIONAL TESTS:    Imaging Personally Reviewed:    Consultant(s) Notes Reviewed:      Care Discussed with Consultants/Other Providers:

## 2022-06-22 NOTE — PROGRESS NOTE ADULT - SUBJECTIVE AND OBJECTIVE BOX
Date of Service   06-22-22 @ 11:58    Patient is a 61y old  Female who presents with a chief complaint of sob (21 Jun 2022 15:44)      INTERVAL HISTORY:     TELEMETRY Personally reviewed:    REVIEW OF SYSTEMS:   CONSTITUTIONAL: No weakness  EYES/ENT: No visual changes; No throat pain  Neck: No pain or stiffness  Respiratory: No cough, wheezing, No shortness of breath  CARDIOVASCULAR: no chest pain or palpitations  GASTROINTESTINAL: No abdominal pain, no nausea, vomiting or hematemesis  GENITOURINARY: No dysuria, frequency or hematuria  NEUROLOGICAL: No stroke like symptoms  SKIN: No rashes    	  MEDICATIONS:        PHYSICAL EXAM:  T(C): 36.8 (06-22-22 @ 11:26), Max: 37.3 (06-21-22 @ 21:32)  HR: 94 (06-22-22 @ 11:26) (94 - 101)  BP: 119/64 (06-22-22 @ 11:26) (115/67 - 121/71)  RR: 18 (06-22-22 @ 11:26) (18 - 20)  SpO2: 93% (06-22-22 @ 11:26) (93% - 95%)  Wt(kg): --  I&O's Summary    21 Jun 2022 07:01  -  22 Jun 2022 07:00  --------------------------------------------------------  IN: 500 mL / OUT: 0 mL / NET: 500 mL    22 Jun 2022 07:01  -  22 Jun 2022 11:58  --------------------------------------------------------  IN: 240 mL / OUT: 0 mL / NET: 240 mL          Appearance: In no distress	  HEENT:    PERRL, EOMI	  Cardiovascular:  S1 S2, No JVD  Respiratory: Lungs clear to auscultation	  Gastrointestinal:  Soft, Non-tender, + BS	  Vasculature:  No edema of LE  Psychiatric: Appropriate affect   Neuro: no acute focal deficits                     Labs personally reviewed      ASSESSMENT/PLAN: 	  62yo female COPD active smoker p/w 5days sob, cough, URI symptoms admitted for acute respiratory failure with hypoxia and severe sepsis due to pna    Problem/Plan - 1:  ·  Problem: Acute respiratory failure with hypoxia.   ·  Plan: Underlying COPD active smoker not on home 02 was hypoxic to 60-80 at home/ER improved to low 90s with in 2L NC breathing comfortably  -likely due to bacterial pna and copd exacerbation  - abx and steroids as per primary team and pulm  - Desaturated to 88% with ambulation. IS encouraged.  - sat >92 on 1 liter  NC of O2   - d/c on home O2     Problem/Plan - 2:  ·  Problem: Abnormal EKG   ·  Plan: findings mostly consistent with known advanced COPD  - hyperacute T waves but normal K and no chest pain, trops negative  - TTE shows EF 70-75%, normal LV systolic function, normal RV size and function, mod diastolic dysfunction, mild pulm pressures  - would check lipid panel with am labs   - on ASA 81mg for primary prevention     Problem/Plan - 3:  ·  Problem: Erythrocytosis.   ·  Plan: hgb 17.7 suspect secondary given active smoker vs dehydration  - started on ASA 81mg    Problem/Plan - 4:  ·  Problem: Need for other prophylactic measure.   ·  Plan: dvt ppx: lovenox 40mg daily        Dayan BOLAND-BC   Scott England DO [facility]  Cardiovascular Medicine  [address] [place] [address], [address]  Office: [phone]

## 2022-06-22 NOTE — PROGRESS NOTE ADULT - PROVIDER SPECIALTY LIST ADULT
Cardiology
Cardiology
Pulmonology
Cardiology
Pulmonology
Cardiology
Pulmonology
Hospitalist
Internal Medicine
Hospitalist
Internal Medicine
Hospitalist
Hospitalist

## 2022-06-22 NOTE — PROGRESS NOTE ADULT - PROBLEM SELECTOR PROBLEM 6
Hyponatremia
Need for other prophylactic measure
Need for other prophylactic measure
Hyponatremia
Need for other prophylactic measure
Need for other prophylactic measure

## 2022-06-22 NOTE — PROGRESS NOTE ADULT - TIME BILLING
reviewing chart and coordinating care with primary team/staff, as well as reviewing vitals, radiology, medication list, recent labs, and prior records.
DC time 50min

## 2022-06-22 NOTE — PROGRESS NOTE ADULT - PROBLEM SELECTOR PLAN 3
- Initially admitted with sepsis due to PNA  - afebrile, not septic now  - Treatment as above  - resolved
Initially admitted with sepsis due to PNA  - afebrile, not septic now  - Treatment as above
Initially admitted with sepsis due to PNA  - afebrile, not septic now, WBC 9.9 from 13  - Treatment as above
- Initially admitted with sepsis due to PNA  - afebrile, not septic now  - Treatment as above
calcium normalized after IVH  -send PTH, vitamin D  -monitor bmp
calcium normalized after IVH  -monitor bmp

## 2022-06-22 NOTE — PROGRESS NOTE ADULT - PROBLEM SELECTOR PLAN 4
No chest pain, EKG- ST, tall T wave, normal K+, Normal Trop  - TTE EF 70%, normal LV function with grade II diastolic disfunction  - appreciated Cardiology consult, outpatient f/u
- No chest pain, EKG- ST, tall T wave, normal K+, Normal Trop  - TTE EF 70%, normal LV function with grade II diastolic disfunction  - appreciated Cardiology consult, outpatient f/u  - c/w asprin 81mg qd
No chest pain, EKG- ST, tall T wave, normal K+, Normal Trop  - TTE EF 70%, normal LV function with grade II diastolic disfunction  - appreciated Cardiology consult, outpatient f/u
- No chest pain, EKG- ST, tall T wave, normal K+, Normal Trop  - TTE EF 70%, normal LV function with grade II diastolic disfunction  - appreciated Cardiology consult, outpatient f/u
sinus tachy with biatrial enlargement, likely due to pulm disease  -sinus tachycardia likely due to duoneb, steroids, sepsis infection, no risk factor, hx and exam not consistent PE/DVT  -no cp, trop negative  -cards eval appreciated, agree that EKG abnormalities c/w pulm disease. Can have outpt TTE
sinus tachy with biatrial enlargement, likely due to pulm disease  -sinus tachycardia likely due to duoneb, steroids, sepsis infection, no risk factor, hx and exam not consistent PE/DVT  -no cp, trop negative  -cards eval appreciated, agree that EKG abnormalities c/w pulm disease.  - TTE with grade II diastolic disfunction

## 2022-06-22 NOTE — PROGRESS NOTE ADULT - PROBLEM SELECTOR PLAN 5
Likely from dehydration  - resolved w IVF
hgb 17.7 suspect secondary given active smoker vs dehydration  -send epo and jak2 in AM  -monitor cbc
hgb 17.7 suspect secondary given active smoker vs dehydration  -Resolved with IVF  -monitor cbc

## 2022-06-22 NOTE — PROGRESS NOTE ADULT - ASSESSMENT
62yo female COPD active smoker p/w 5days sob, cough, URI symptoms admitted for acute respiratory failure with hypoxia and severe sepsis due to copd exacerabion

## 2022-06-22 NOTE — PROGRESS NOTE ADULT - PROBLEM SELECTOR PLAN 2
- Acute hypoxic rsp. failure, COPD exacerbation is due to Pna  - Improving on Bronchodilators, IV anbx (Levaquin), Prednisone 40mg/d   - Blood and sputum c/s neg  - appreciated Pulmonary plan
Acute hypoxic rsp. failure, COPD exacerbation is due to Pna  - Improving on Bronchodilators, IV anbx (Levaquin), Prednisone 40mg/d   - Blood and sputum c/s neg  - appreciated Pulmonary plan
Acute hypoxic rsp. failure, COPD exacerbation is due to Pna  - Improving on Bronchodilators, IV anbx (Levaquin), Prednisone 40mg/d and inhaled steroid  - Blood and sputum c/s neg  - c/w O2 support  - appreciated Pulmonary plan
- Acute hypoxic rsp. failure, COPD exacerbation is due to Pna  - Improving on Bronchodilators, IV anbx (Levaquin), Prednisone 40mg/d   - Blood and sputum c/s neg  - appreciated Pulmonary plan
severe sepsis likely to multifocal bacterial pna (although procal minimally elevated)  -tachy, leukocytosis   -lactic normal  -c/w levaquin 750mg as above  -fu sputum culture and blood cx  -urine legionella  -TSH normal
severe sepsis likely to multifocal bacterial pna  -tachy, leukocytosis   -lactic normal  -c/w levaquin 750mg as above  -sputum culture with normal respiratory mathieu  - blood cx NGTD  -urine legionella not collected  -TSH normal

## 2022-06-22 NOTE — PROGRESS NOTE ADULT - PROBLEM SELECTOR PLAN 6
133 mild, suspect poor po intake, asymptomatic  trend bmp  encourage po intake  stable
dvt ppx: lovenox 40mg daily    Dispo- Home, reports daughter will pick her up today
dvt ppx: lovenox 40mg daily
dvt ppx: lovenox 40mg daily
dvt ppx: lovenox 40mg daily    Dispo- Home, reports daughter will pick her up tomorrow
132 mild, suspect poor po intake, asymptomatic  trend bmp  encourage po intake  stable

## 2022-06-22 NOTE — PROGRESS NOTE ADULT - NS ATTEND AMEND GEN_ALL_CORE FT
Pt care and plan discussed and reviewed with NP. Plan as outlined above edited by me to reflect our discussion.

## 2022-06-22 NOTE — PROGRESS NOTE ADULT - PROBLEM SELECTOR PLAN 1
Due to COPD and Upper lobes PNA, has acute hypoxic respiratory failure on O2  - Improving on Bronchodilators, IV anbx (Levaquin), Prednisone 40mg/d and inhaled steroid  - Blood and sputum c/s neg  - c/w O2 support  - PT eval, O2 sat on ambulation
- Due to COPD and Upper lobes PNA, has acute hypoxic respiratory failure on O2  - Improving on Bronchodilators  - c/w IV anbx Levaquin for total 7 day course last day 6/21  - c/w Prednisone 40mg/d for total 5 days last day 6/22  - Blood and sputum c/s neg  - will require home O2 on discharge
Due to COPD and Upper lobes PNA, has acute hypoxic respiratory failure on O2  - Improving on Bronchodilators  - c/w IV anbx (Levaquin) for 7 day course  - c/w Prednisone 40mg/d for total 5 days   - Blood and sputum c/s neg  - wean off O2  - PT eval, O2 sat on ambulation
Underlying COPD active smoker not on home 02 was hypoxic to 60-80 at home/ER improved to low 90s with in 2L NC breathing comfortably. Not on home oxygen   -likely due to bacterial pna and copd exacerbation  -has RML and ?LLL PNA on CXR   -RVP negative  -wean NC as tolerated   -s/p levaquin in ER, will c/w levaquin 750mg daily. qtc 430  -on IV solumedrol 40mg q12  -enora at home  -start Duoneb q6 ATC  -supportive care  -follow up final pulmonary recs - I discussed CT chest with patient but she wants to hold off for now. agreeable if she does not continue to get better
- Due to COPD and Upper lobes PNA, has acute hypoxic respiratory failure on O2  - Improving on Bronchodilators  - c/w IV anbx Levaquin for total 7 day course last day 6/21  - c/w Prednisone 40mg/d for total 5 days last day 6/22  - Blood and sputum c/s neg  - will require home O2 on discharge, has it set up at home
Underlying COPD active smoker not on home 02 was hypoxic to 60-80 at home/ER improved to low 90s with in 2L NC breathing comfortably. Not on home oxygen   -likely due to bacterial pna and copd exacerbation  -CT chest with multifocal PNA as per my read  -RVP negative  -wean NC as tolerated   - c/w levaquin 750mg daily. qtc 430  -solumedrol 40mg q12 changed to Prednisone 40mg daily  -enora at home  -c/w Duoneb q6 ATC  -supportive care  -Pulm following

## 2022-06-28 ENCOUNTER — TRANSCRIPTION ENCOUNTER (OUTPATIENT)
Age: 62
End: 2022-06-28

## 2022-07-12 ENCOUNTER — APPOINTMENT (OUTPATIENT)
Dept: PULMONOLOGY | Facility: CLINIC | Age: 62
End: 2022-07-12

## 2022-07-12 VITALS
SYSTOLIC BLOOD PRESSURE: 133 MMHG | RESPIRATION RATE: 15 BRPM | HEART RATE: 104 BPM | WEIGHT: 111.13 LBS | TEMPERATURE: 97.4 F | HEIGHT: 65 IN | BODY MASS INDEX: 18.52 KG/M2 | OXYGEN SATURATION: 88 % | DIASTOLIC BLOOD PRESSURE: 85 MMHG

## 2022-07-12 DIAGNOSIS — Z99.81 DEPENDENCE ON SUPPLEMENTAL OXYGEN: ICD-10-CM

## 2022-07-12 PROCEDURE — 99214 OFFICE O/P EST MOD 30 MIN: CPT

## 2022-07-12 NOTE — REASON FOR VISIT
[Initial] : an initial visit [Abnormal CXR/ Chest CT] : an abnormal CXR/ chest CT [TextBox_44] : copd

## 2022-07-12 NOTE — PHYSICAL EXAM
[No Acute Distress] : no acute distress [Well Nourished] : well nourished [Well Developed] : well developed [Normal S1, S2] : normal s1, s2 [No Resp Distress] : no resp distress [Clear to Auscultation Bilaterally] : clear to auscultation bilaterally [No Clubbing] : no clubbing [No Edema] : no edema [Oriented x3] : oriented x3 [Normal Affect] : normal affect [Normal Appearance] : normal appearance [No Neck Mass] : no neck mass [Normal Rate/Rhythm] : normal rate/rhythm [Scoliosis] : scoliosis [TextBox_2] : scoliosis [TextBox_54] : Rapid rate [TextBox_68] : reduced lung sounds

## 2022-07-12 NOTE — ASSESSMENT
[FreeTextEntry1] : 61F with pmhx  of BEATA (on cpap), COPD who was hospitalized for 5 days in June 2022 due to  acute respiratory failure, hypoxia, sepsis and copd exacerbation. She was treated with IV levaquin and prednisone 40mg x 5 days, and discharged on Suplemented oxygen at 2LPM. She is using Incruse Ellipta daily and albuterol prn, once a week xopenex neb tx. \par She has documented hx diastolic heart failure ( was last seen by cardio 2018 and rx'ed Lasix prn).\par \par Today she denies respiratory or cardiac symptoms. SaO2 at rest 92%, HR 103bpm regular, desaturated to 89% after walking about 450ft. \par \par We have personally reviewed her imaging.\par CT chest 2010 showed a small partially loculated right pleural effusion with thickening of visceral and parietal pleura on the right. Emphysema. Biapical pleural thickening and scarring is seen. Mild bronchiectasis in the right lower lobe. She denies hx of TB or travel to countries where TB s prevalent. \par \par CT chest 6/17/22 shows mucoid impacted distal airways. Patchy and multifocal ground-glass opacities primarily in the bilateral upper lobes, new from 2011 and likely represented acute infection during the hospitalization. Right apical scarring is new. Left lower lobe 0.7 cm nodule (image 112, series 3) is new.\par Chronic right pleural thickening with calcifications and right basilar atelectasis with associated volume loss.\par The calcification was not present in 2010.\par \par PFTs in 2018 showed severe obstruction but require follow up\par Echo in June, 2022  showed moderate diastolic dysfunction, normal RV size and function and mild pulmonary hypertension.  PA systolic pressure was 49.  \par Plan\par 1. blood work to rule out alpha one, immune deficiency, prior TB exposure\par 2. complete PFTs ordered\par 3.  exercise oximetry\par 4. Smoking cessation discussed and pt was given referral to smoking cessation program\par 5. CT chest to follow up resolution of PNA in August. \par 6. Monitor pulse oximeter. Use portable oxygen for long walks. \par 7. Advised to continue Incrue Ellipta and Symbicort BID ordered.  \par \par At nest visit will address BEATA and try to obtain compliance report, ensure that she is receiving supplies.  \par \par follow up after the tests\par

## 2022-07-12 NOTE — HISTORY OF PRESENT ILLNESS
[Current] : current [TextBox_4] : 61F with pmhx of COPD, congestive diastolic heart failure, hyponatremia, pulmonary hypertension on home oxygen who was previously treated by Judson Smith in 2021. \par \par PFT in 2018 showed severe obstruction with no bd repsonse. \par PSG 2018 showed severe BEATA with AHI 35/hrlowest SpO2 77%, mean 86%. She is currently on CPAP and reports to be adherent. \par \par She is using Incruse Ellipta daily for the past 3 yrs. Albuterol used rarely, usually more when its humid.\par Neb alb once a week for maintenance. (in the past tried breo and it caused bloating) \par \par June 11, 2022 presented at [facility] ED with 5 days of sob, cough, URI symptoms admitted for PNA, acute respiratory failure with hypoxia and severe sepsis due to copd exacerbation. She was treated with IV levaquin x 7 days (last day 6/21), prednisone 40mg x 5 days (last day 6/22). Blood and sputum cultures were negative. She was discharged on supplemented o2 at 2LPM. She has a portable and stationary device. TTE EF 70%, normal LV function with grade II diastolic disfunction. Also discharged on Symbicort which she has not used.  \par \par She stopped using oxygen about 1 week ago. Reports to be back at baseline and denies respiratory/cardiac symptoms. \par Denies palpitations, dizziness, headache or blurry vision. no chest pain, cough or sob. \par \par COVID: vaccinated and 1 booster. no hx covid infection. \par Received 2 pna vaccines (2020, 2021). \par \par Current smoker 10 cig/day (in the past 25cig/day), on/off. interested in smoking cessation program. patches helped. Chantix caused depression. \par \par no family hx of  cancer. He aunt was a smoker and had severe COPD, requiring oxygen.\par \par Occupation: works as  \par \par No known exposures to asbestos or other lung irritants.  [TextBox_11] : 1 [TextBox_13] : 35 [TextBox_29] : smoking on/off

## 2022-07-13 ENCOUNTER — NON-APPOINTMENT (OUTPATIENT)
Age: 62
End: 2022-07-13

## 2022-07-13 LAB
A1AT SERPL-MCNC: 160 MG/DL
ALBUMIN SERPL ELPH-MCNC: 4.5 G/DL
ALP BLD-CCNC: 86 U/L
ALT SERPL-CCNC: 10 U/L
ANION GAP SERPL CALC-SCNC: 10 MMOL/L
AST SERPL-CCNC: 15 U/L
BASOPHILS # BLD AUTO: 0.04 K/UL
BASOPHILS NFR BLD AUTO: 0.6 %
BILIRUB SERPL-MCNC: 0.5 MG/DL
BUN SERPL-MCNC: 8 MG/DL
CALCIUM SERPL-MCNC: 10 MG/DL
CHLORIDE SERPL-SCNC: 99 MMOL/L
CO2 SERPL-SCNC: 30 MMOL/L
CREAT SERPL-MCNC: 0.5 MG/DL
DEPRECATED KAPPA LC FREE/LAMBDA SER: 1.16 RATIO
EGFR: 108 ML/MIN/1.73M2
EOSINOPHIL # BLD AUTO: 0.14 K/UL
EOSINOPHIL NFR BLD AUTO: 1.9 %
GLUCOSE SERPL-MCNC: 104 MG/DL
HCT VFR BLD CALC: 46.3 %
HGB BLD-MCNC: 14.8 G/DL
IGA SER QL IEP: 124 MG/DL
IGG SER QL IEP: 631 MG/DL
IGM SER QL IEP: 33 MG/DL
IMM GRANULOCYTES NFR BLD AUTO: 0.3 %
KAPPA LC CSF-MCNC: 1.26 MG/DL
KAPPA LC SERPL-MCNC: 1.46 MG/DL
LYMPHOCYTES # BLD AUTO: 1.8 K/UL
LYMPHOCYTES NFR BLD AUTO: 24.8 %
MAN DIFF?: NORMAL
MCHC RBC-ENTMCNC: 30.1 PG
MCHC RBC-ENTMCNC: 32 GM/DL
MCV RBC AUTO: 94.3 FL
MONOCYTES # BLD AUTO: 0.67 K/UL
MONOCYTES NFR BLD AUTO: 9.2 %
NEUTROPHILS # BLD AUTO: 4.59 K/UL
NEUTROPHILS NFR BLD AUTO: 63.2 %
PLATELET # BLD AUTO: 223 K/UL
POTASSIUM SERPL-SCNC: 4 MMOL/L
PROT SERPL-MCNC: 6.2 G/DL
RBC # BLD: 4.91 M/UL
RBC # FLD: 13 %
SODIUM SERPL-SCNC: 140 MMOL/L
WBC # FLD AUTO: 7.26 K/UL

## 2022-07-13 RX ORDER — LEVALBUTEROL HYDROCHLORIDE 0.63 MG/3ML
0.63 SOLUTION RESPIRATORY (INHALATION)
Qty: 4 | Refills: 3 | Status: ACTIVE | COMMUNITY
Start: 2018-02-21 | End: 1900-01-01

## 2022-07-14 LAB — IGE SER-MCNC: 65 KU/L

## 2022-07-15 LAB
A1AT PHENOTYP SERPL-IMP: NORMAL
A1AT SERPL-MCNC: 155 MG/DL

## 2022-09-11 ENCOUNTER — NON-APPOINTMENT (OUTPATIENT)
Age: 62
End: 2022-09-11

## 2022-09-15 ENCOUNTER — APPOINTMENT (OUTPATIENT)
Dept: PULMONOLOGY | Facility: CLINIC | Age: 62
End: 2022-09-15

## 2022-09-15 VITALS
BODY MASS INDEX: 18.77 KG/M2 | HEIGHT: 65.5 IN | TEMPERATURE: 97.6 F | HEART RATE: 86 BPM | WEIGHT: 114 LBS | SYSTOLIC BLOOD PRESSURE: 112 MMHG | DIASTOLIC BLOOD PRESSURE: 70 MMHG

## 2022-09-15 PROCEDURE — 94729 DIFFUSING CAPACITY: CPT

## 2022-09-15 PROCEDURE — ZZZZZ: CPT

## 2022-09-15 PROCEDURE — 94618 PULMONARY STRESS TESTING: CPT

## 2022-09-15 PROCEDURE — 94060 EVALUATION OF WHEEZING: CPT

## 2022-09-15 PROCEDURE — 99214 OFFICE O/P EST MOD 30 MIN: CPT | Mod: 25

## 2022-09-15 PROCEDURE — 94726 PLETHYSMOGRAPHY LUNG VOLUMES: CPT

## 2022-09-15 NOTE — HISTORY OF PRESENT ILLNESS
[TextBox_4] : 61F with pmhx of BEATA (on cpap), COPD who was hospitalized for 5 days in June 2022 due to acute respiratory failure, hypoxia, sepsis and copd exacerbation. She was treated with IV levaquin and prednisone 40mg x 5 days, and discharged on Suplemental oxygen at 2LPM. On initial evaluation in July 2022 she was using Incruse Ellipta daily and albuterol prn, once a week xopenex neb tx. \par She has documented hx diastolic heart failure ( was last seen by cardio 2018 and rx'ed Lasix prn).\par \par At that visit, SaO2 at rest 92%, HR 103bpm regular, desaturated to 89% after walking about 450ft. \par \par CT chest 2010 showed a small partially loculated right pleural effusion with thickening of visceral and parietal pleura on the right. Emphysema. Biapical pleural thickening and scarring is seen. Mild bronchiectasis in the right lower lobe. She denies hx of TB or travel to countries where TB s prevalent. \par \par CT chest 6/17/22 shows mucoid impacted distal airways. Patchy and multifocal ground-glass opacities primarily in the bilateral upper lobes, new from 2011 and likely represented acute infection during the hospitalization. Right apical scarring is new. Left lower lobe 0.7 cm nodule (image 112, series 3) is new.\par Chronic right pleural thickening with calcifications and right basilar atelectasis with associated volume loss.\par The calcification was not present in 2010.\par \par PFTs in 2018 showed severe obstruction\par Echo in June, 2022 showed moderate diastolic dysfunction, normal RV size and function and mild pulmonary hypertension. PA systolic pressure was 49. \par alpha one level was wnl.  CT Chest ordered and PFTs.  Symbicort ordered in addition to Incruse.\par \par Returns for follow up today.  \par She is feeling better on Symbicort.  Continues to use Incruse Ellipta.  No rescue inhaler use.\par Has cough productive of mucous, thin, no hemoptysis.\par \par Started smoking again, asking for smoking cessation.  1 PPD currently.  \par \par She desaturated during exercise oximetry to 85 % and does not want oxygen.  \par

## 2022-09-15 NOTE — ASSESSMENT
[FreeTextEntry1] : 62F with pmhx  of BEATA (on cpap), COPD referred to me after COPD exacerbation which required hospitalization.  CT at that time showed patchy and multifocal GGO in upper lobes and a new o.7cm nodule.  \par \par Currently on INcruse Ellipta and Symbicort 160 2 puffs twice daily.\par She is feeling better.\par Did not have follow up Chest CT as ordered- authorization has .\par \par ON PE today BS are distant but clear.  There is no wheezing or edema. Has scoliosis.\par \par PFTs today show very severe obstruction. LUng volumes are normal.  DLCO is moderately reduced. \par EOT showed desaturation to 85% after walking 5 minutes at 1.2mph. \par \par Echo in   showed moderate diastolic dysfunction, normal RV size and function and mild pulmonary hypertension.  PA systolic pressure was 49.  \par Plan\par 1- Continue Symbicort and Incruse Ellipta\par 2.  referral to smoking cessation program given\par 3.. CT chest to follow up resolution of PNA and assess new nodule ordered. \par 4. Advised to  Use portable oxygen for long walks- she does not want to use oxygen \par Will advise further after Chest CT.\par We discussed smoking cessation, need for oxygen and lung transplant evaluation, which she is not ready to pursue at this time psychologically.\par F/U in 3 months.  \par

## 2022-10-04 ENCOUNTER — NON-APPOINTMENT (OUTPATIENT)
Age: 62
End: 2022-10-04

## 2023-01-15 ENCOUNTER — NON-APPOINTMENT (OUTPATIENT)
Age: 63
End: 2023-01-15

## 2023-01-23 ENCOUNTER — INPATIENT (INPATIENT)
Facility: HOSPITAL | Age: 63
LOS: 3 days | Discharge: HOME | DRG: 190 | End: 2023-01-27
Attending: STUDENT IN AN ORGANIZED HEALTH CARE EDUCATION/TRAINING PROGRAM | Admitting: STUDENT IN AN ORGANIZED HEALTH CARE EDUCATION/TRAINING PROGRAM
Payer: COMMERCIAL

## 2023-01-23 VITALS
OXYGEN SATURATION: 74 % | HEART RATE: 127 BPM | TEMPERATURE: 98 F | DIASTOLIC BLOOD PRESSURE: 81 MMHG | SYSTOLIC BLOOD PRESSURE: 158 MMHG | RESPIRATION RATE: 30 BRPM

## 2023-01-23 DIAGNOSIS — D75.1 SECONDARY POLYCYTHEMIA: ICD-10-CM

## 2023-01-23 DIAGNOSIS — R06.02 SHORTNESS OF BREATH: ICD-10-CM

## 2023-01-23 DIAGNOSIS — J44.1 CHRONIC OBSTRUCTIVE PULMONARY DISEASE WITH (ACUTE) EXACERBATION: ICD-10-CM

## 2023-01-23 DIAGNOSIS — Z29.9 ENCOUNTER FOR PROPHYLACTIC MEASURES, UNSPECIFIED: ICD-10-CM

## 2023-01-23 DIAGNOSIS — E87.1 HYPO-OSMOLALITY AND HYPONATREMIA: ICD-10-CM

## 2023-01-23 DIAGNOSIS — I50.33 ACUTE ON CHRONIC DIASTOLIC (CONGESTIVE) HEART FAILURE: ICD-10-CM

## 2023-01-23 DIAGNOSIS — R65.10 SYSTEMIC INFLAMMATORY RESPONSE SYNDROME (SIRS) OF NON-INFECTIOUS ORIGIN WITHOUT ACUTE ORGAN DYSFUNCTION: ICD-10-CM

## 2023-01-23 LAB
ALBUMIN SERPL ELPH-MCNC: 4.8 G/DL — SIGNIFICANT CHANGE UP (ref 3.3–5)
ALP SERPL-CCNC: 121 U/L — HIGH (ref 40–120)
ALT FLD-CCNC: 18 U/L — SIGNIFICANT CHANGE UP (ref 10–45)
ANION GAP SERPL CALC-SCNC: 12 MMOL/L — SIGNIFICANT CHANGE UP (ref 5–17)
ANION GAP SERPL CALC-SCNC: 9 MMOL/L — SIGNIFICANT CHANGE UP (ref 5–17)
APPEARANCE UR: CLEAR — SIGNIFICANT CHANGE UP
AST SERPL-CCNC: 38 U/L — SIGNIFICANT CHANGE UP (ref 10–40)
BASE EXCESS BLDV CALC-SCNC: 6.7 MMOL/L — HIGH (ref -2–3)
BASOPHILS # BLD AUTO: 0.05 K/UL — SIGNIFICANT CHANGE UP (ref 0–0.2)
BASOPHILS NFR BLD AUTO: 0.4 % — SIGNIFICANT CHANGE UP (ref 0–2)
BILIRUB SERPL-MCNC: 0.8 MG/DL — SIGNIFICANT CHANGE UP (ref 0.2–1.2)
BILIRUB UR-MCNC: NEGATIVE — SIGNIFICANT CHANGE UP
BUN SERPL-MCNC: 13 MG/DL — SIGNIFICANT CHANGE UP (ref 7–23)
BUN SERPL-MCNC: 15 MG/DL — SIGNIFICANT CHANGE UP (ref 7–23)
CA-I SERPL-SCNC: 1.34 MMOL/L — HIGH (ref 1.15–1.33)
CALCIUM SERPL-MCNC: 11 MG/DL — HIGH (ref 8.4–10.5)
CALCIUM SERPL-MCNC: 9.4 MG/DL — SIGNIFICANT CHANGE UP (ref 8.4–10.5)
CHLORIDE BLDV-SCNC: 82 MMOL/L — LOW (ref 96–108)
CHLORIDE SERPL-SCNC: 82 MMOL/L — LOW (ref 96–108)
CHLORIDE SERPL-SCNC: 86 MMOL/L — LOW (ref 96–108)
CO2 BLDV-SCNC: 41 MMOL/L — HIGH (ref 22–26)
CO2 SERPL-SCNC: 28 MMOL/L — SIGNIFICANT CHANGE UP (ref 22–31)
CO2 SERPL-SCNC: 34 MMOL/L — HIGH (ref 22–31)
COLOR SPEC: SIGNIFICANT CHANGE UP
CREAT ?TM UR-MCNC: 49 MG/DL — SIGNIFICANT CHANGE UP
CREAT SERPL-MCNC: 0.4 MG/DL — LOW (ref 0.5–1.3)
CREAT SERPL-MCNC: 0.54 MG/DL — SIGNIFICANT CHANGE UP (ref 0.5–1.3)
DIFF PNL FLD: NEGATIVE — SIGNIFICANT CHANGE UP
EGFR: 105 ML/MIN/1.73M2 — SIGNIFICANT CHANGE UP
EGFR: 113 ML/MIN/1.73M2 — SIGNIFICANT CHANGE UP
EOSINOPHIL # BLD AUTO: 0.03 K/UL — SIGNIFICANT CHANGE UP (ref 0–0.5)
EOSINOPHIL NFR BLD AUTO: 0.3 % — SIGNIFICANT CHANGE UP (ref 0–6)
GAS PNL BLDV: 119 MMOL/L — CRITICAL LOW (ref 136–145)
GAS PNL BLDV: SIGNIFICANT CHANGE UP
GAS PNL BLDV: SIGNIFICANT CHANGE UP
GLUCOSE BLDV-MCNC: 125 MG/DL — HIGH (ref 70–99)
GLUCOSE SERPL-MCNC: 124 MG/DL — HIGH (ref 70–99)
GLUCOSE SERPL-MCNC: 173 MG/DL — HIGH (ref 70–99)
GLUCOSE UR QL: NEGATIVE — SIGNIFICANT CHANGE UP
HCO3 BLDV-SCNC: 38 MMOL/L — HIGH (ref 22–29)
HCT VFR BLD CALC: 58.8 % — CRITICAL HIGH (ref 34.5–45)
HCT VFR BLDA CALC: 54 % — HIGH (ref 34.5–46.5)
HGB BLD CALC-MCNC: 17.9 G/DL — HIGH (ref 11.7–16.1)
HGB BLD-MCNC: 18.9 G/DL — HIGH (ref 11.5–15.5)
HOROWITZ INDEX BLDV+IHG-RTO: SIGNIFICANT CHANGE UP
IMM GRANULOCYTES NFR BLD AUTO: 0.7 % — SIGNIFICANT CHANGE UP (ref 0–0.9)
KETONES UR-MCNC: NEGATIVE — SIGNIFICANT CHANGE UP
LACTATE BLDV-MCNC: 1.7 MMOL/L — SIGNIFICANT CHANGE UP (ref 0.5–2)
LEUKOCYTE ESTERASE UR-ACNC: NEGATIVE — SIGNIFICANT CHANGE UP
LYMPHOCYTES # BLD AUTO: 1.57 K/UL — SIGNIFICANT CHANGE UP (ref 1–3.3)
LYMPHOCYTES # BLD AUTO: 13.5 % — SIGNIFICANT CHANGE UP (ref 13–44)
MCHC RBC-ENTMCNC: 29.2 PG — SIGNIFICANT CHANGE UP (ref 27–34)
MCHC RBC-ENTMCNC: 32.1 GM/DL — SIGNIFICANT CHANGE UP (ref 32–36)
MCV RBC AUTO: 90.9 FL — SIGNIFICANT CHANGE UP (ref 80–100)
MONOCYTES # BLD AUTO: 0.99 K/UL — HIGH (ref 0–0.9)
MONOCYTES NFR BLD AUTO: 8.5 % — SIGNIFICANT CHANGE UP (ref 2–14)
NEUTROPHILS # BLD AUTO: 8.95 K/UL — HIGH (ref 1.8–7.4)
NEUTROPHILS NFR BLD AUTO: 76.6 % — SIGNIFICANT CHANGE UP (ref 43–77)
NITRITE UR-MCNC: NEGATIVE — SIGNIFICANT CHANGE UP
NRBC # BLD: 0 /100 WBCS — SIGNIFICANT CHANGE UP (ref 0–0)
NT-PROBNP SERPL-SCNC: 1151 PG/ML — HIGH (ref 0–300)
OSMOLALITY UR: 399 MOS/KG — SIGNIFICANT CHANGE UP (ref 300–900)
PCO2 BLDV: 83 MMHG — HIGH (ref 39–42)
PH BLDV: 7.27 — LOW (ref 7.32–7.43)
PH UR: 5.5 — SIGNIFICANT CHANGE UP (ref 5–8)
PLATELET # BLD AUTO: 250 K/UL — SIGNIFICANT CHANGE UP (ref 150–400)
PO2 BLDV: 50 MMHG — HIGH (ref 25–45)
POTASSIUM BLDV-SCNC: 4.6 MMOL/L — SIGNIFICANT CHANGE UP (ref 3.5–5.1)
POTASSIUM SERPL-MCNC: 4.1 MMOL/L — SIGNIFICANT CHANGE UP (ref 3.5–5.3)
POTASSIUM SERPL-MCNC: 5.5 MMOL/L — HIGH (ref 3.5–5.3)
POTASSIUM SERPL-SCNC: 4.1 MMOL/L — SIGNIFICANT CHANGE UP (ref 3.5–5.3)
POTASSIUM SERPL-SCNC: 5.5 MMOL/L — HIGH (ref 3.5–5.3)
PROT SERPL-MCNC: 7.6 G/DL — SIGNIFICANT CHANGE UP (ref 6–8.3)
PROT UR-MCNC: NEGATIVE — SIGNIFICANT CHANGE UP
RAPID RVP RESULT: SIGNIFICANT CHANGE UP
RBC # BLD: 6.47 M/UL — HIGH (ref 3.8–5.2)
RBC # FLD: 12.8 % — SIGNIFICANT CHANGE UP (ref 10.3–14.5)
SAO2 % BLDV: 79.7 % — SIGNIFICANT CHANGE UP (ref 67–88)
SARS-COV-2 RNA SPEC QL NAA+PROBE: SIGNIFICANT CHANGE UP
SODIUM SERPL-SCNC: 122 MMOL/L — LOW (ref 135–145)
SODIUM SERPL-SCNC: 129 MMOL/L — LOW (ref 135–145)
SODIUM UR-SCNC: 31 MMOL/L — SIGNIFICANT CHANGE UP
SP GR SPEC: 1.01 — SIGNIFICANT CHANGE UP (ref 1.01–1.02)
TROPONIN T, HIGH SENSITIVITY RESULT: 15 NG/L — SIGNIFICANT CHANGE UP (ref 0–51)
UROBILINOGEN FLD QL: NEGATIVE — SIGNIFICANT CHANGE UP
UUN UR-MCNC: 509 MG/DL — SIGNIFICANT CHANGE UP
WBC # BLD: 11.67 K/UL — HIGH (ref 3.8–10.5)
WBC # FLD AUTO: 11.67 K/UL — HIGH (ref 3.8–10.5)

## 2023-01-23 PROCEDURE — 71045 X-RAY EXAM CHEST 1 VIEW: CPT | Mod: 26

## 2023-01-23 PROCEDURE — 99223 1ST HOSP IP/OBS HIGH 75: CPT

## 2023-01-23 PROCEDURE — 99285 EMERGENCY DEPT VISIT HI MDM: CPT

## 2023-01-23 RX ORDER — MAGNESIUM SULFATE 500 MG/ML
2 VIAL (ML) INJECTION ONCE
Refills: 0 | Status: COMPLETED | OUTPATIENT
Start: 2023-01-23 | End: 2023-01-23

## 2023-01-23 RX ORDER — ENOXAPARIN SODIUM 100 MG/ML
40 INJECTION SUBCUTANEOUS EVERY 24 HOURS
Refills: 0 | Status: DISCONTINUED | OUTPATIENT
Start: 2023-01-23 | End: 2023-01-27

## 2023-01-23 RX ORDER — BUDESONIDE AND FORMOTEROL FUMARATE DIHYDRATE 160; 4.5 UG/1; UG/1
2 AEROSOL RESPIRATORY (INHALATION)
Qty: 0 | Refills: 0 | DISCHARGE

## 2023-01-23 RX ORDER — BUDESONIDE AND FORMOTEROL FUMARATE DIHYDRATE 160; 4.5 UG/1; UG/1
2 AEROSOL RESPIRATORY (INHALATION)
Refills: 0 | Status: DISCONTINUED | OUTPATIENT
Start: 2023-01-23 | End: 2023-01-27

## 2023-01-23 RX ORDER — UMECLIDINIUM 62.5 UG/1
1 AEROSOL, POWDER ORAL
Qty: 0 | Refills: 0 | DISCHARGE

## 2023-01-23 RX ORDER — IPRATROPIUM/ALBUTEROL SULFATE 18-103MCG
3 AEROSOL WITH ADAPTER (GRAM) INHALATION EVERY 6 HOURS
Refills: 0 | Status: DISCONTINUED | OUTPATIENT
Start: 2023-01-23 | End: 2023-01-27

## 2023-01-23 RX ORDER — FUROSEMIDE 40 MG
40 TABLET ORAL DAILY
Refills: 0 | Status: DISCONTINUED | OUTPATIENT
Start: 2023-01-23 | End: 2023-01-25

## 2023-01-23 RX ORDER — AZITHROMYCIN 500 MG/1
500 TABLET, FILM COATED ORAL ONCE
Refills: 0 | Status: COMPLETED | OUTPATIENT
Start: 2023-01-23 | End: 2023-01-23

## 2023-01-23 RX ORDER — IPRATROPIUM/ALBUTEROL SULFATE 18-103MCG
3 AEROSOL WITH ADAPTER (GRAM) INHALATION EVERY 6 HOURS
Refills: 0 | Status: DISCONTINUED | OUTPATIENT
Start: 2023-01-23 | End: 2023-01-23

## 2023-01-23 RX ORDER — FUROSEMIDE 40 MG
40 TABLET ORAL DAILY
Refills: 0 | Status: DISCONTINUED | OUTPATIENT
Start: 2023-01-23 | End: 2023-01-23

## 2023-01-23 RX ORDER — CEFTRIAXONE 500 MG/1
1000 INJECTION, POWDER, FOR SOLUTION INTRAMUSCULAR; INTRAVENOUS ONCE
Refills: 0 | Status: COMPLETED | OUTPATIENT
Start: 2023-01-23 | End: 2023-01-23

## 2023-01-23 RX ORDER — IPRATROPIUM/ALBUTEROL SULFATE 18-103MCG
3 AEROSOL WITH ADAPTER (GRAM) INHALATION
Refills: 0 | Status: COMPLETED | OUTPATIENT
Start: 2023-01-23 | End: 2023-01-23

## 2023-01-23 RX ADMIN — Medication 3 MILLILITER(S): at 12:09

## 2023-01-23 RX ADMIN — BUDESONIDE AND FORMOTEROL FUMARATE DIHYDRATE 2 PUFF(S): 160; 4.5 AEROSOL RESPIRATORY (INHALATION) at 18:27

## 2023-01-23 RX ADMIN — AZITHROMYCIN 255 MILLIGRAM(S): 500 TABLET, FILM COATED ORAL at 04:47

## 2023-01-23 RX ADMIN — Medication 3 MILLILITER(S): at 03:05

## 2023-01-23 RX ADMIN — Medication 3 MILLILITER(S): at 03:06

## 2023-01-23 RX ADMIN — Medication 3 MILLILITER(S): at 08:04

## 2023-01-23 RX ADMIN — ENOXAPARIN SODIUM 40 MILLIGRAM(S): 100 INJECTION SUBCUTANEOUS at 12:09

## 2023-01-23 RX ADMIN — Medication 125 MILLIGRAM(S): at 03:31

## 2023-01-23 RX ADMIN — Medication 40 MILLIGRAM(S): at 12:09

## 2023-01-23 RX ADMIN — CEFTRIAXONE 100 MILLIGRAM(S): 500 INJECTION, POWDER, FOR SOLUTION INTRAMUSCULAR; INTRAVENOUS at 03:32

## 2023-01-23 RX ADMIN — Medication 150 GRAM(S): at 03:10

## 2023-01-23 NOTE — H&P ADULT - PROBLEM SELECTOR PLAN 4
Likely hypervolemic hyponatremia in setting of CHF exacerbation. Improved from 122->127 without intervention  - CTM on diuresis  - Obtain urine studies, serum osmolality

## 2023-01-23 NOTE — ED PROVIDER NOTE - CLINICAL SUMMARY MEDICAL DECISION MAKING FREE TEXT BOX
Attending MD Hayes.  Agree with above.  Pt is a 61 yo fem with pmhx of COPD requiring previous intubation 2/2 hypoxic resp failure who presents to ED today with complaint 1 day SOB worsening and bilateral LE edema to mid shin that is new.  Breath sounds restricted with limited air movement on arrival.  Pt hypoxic to 77% ORA on arrival to ED.  Does not use home O2 but uses Bipap at night.  No recent  Pt with improved WOB, improved color and mentation with NRB.  Planned duonebs/steroids/CXR.  Suspect COPD exacerbation + CHF vs COPD exacerbation + new pulm HTN.  Pt had echo previously but has not had one in some time.  Planned admission for echo/pulm management.

## 2023-01-23 NOTE — H&P ADULT - PROBLEM SELECTOR PLAN 2
Wheezes heard on exam, CXR consistent with hyperinflation  - Received MgSO4, solumedrol in ER  - Continue Duonebs  - Continue steroids with prednisone 40 qD for 5 day course  - Hold off on antibiotics as no clear signs of pneumonia  - BIPAP at night  - Wean O2 supplementation as tolerated

## 2023-01-23 NOTE — ED PROVIDER NOTE - OBJECTIVE STATEMENT
61 yo F with hx of COPD not on home O2 presenting with shortness of breath and hypoxia. Patient reports symptoms for the last 24 hours with associated nonproductive cough. Checked her O2 at home which was 70s. Has noticed progressive lower extremity swelling as well. No fevers, chills, known sick contacts.

## 2023-01-23 NOTE — H&P ADULT - ATTENDING COMMENTS
62F with PMHx COPD, active 30 pack-year smoker, dCHF, and history of intubation for CAP presents for BLE edema and shortness of breath. Most likely 2/2 CHF exacerbation given edema, orthopnea, and elevated pro-BNP. Less likely acute COPD exacerbation given no increase in sputum production, minimal wheezing.    #Diastolic Heart Failure  - TTE  - Diuresis  - Monitor I/Os/weights  - Wean off O2    #COPD  - Duonebs, Spiriva  - Continue 40mg prednisone x 5 days  - Monitor off abx    #Hypervolemic Hyponatremia - Likely 2/2 CHF  - Monitor BMP  - Avoid overcorrection (no more than 6-8 mEq in 24h)  - Diuresis as above  - F/u lytes  - less likely SIADH r/t lung nodule    #Lung nodule - Noted last admission, recommended for follow up CT, not performed  - CT chest    #DVT ppx  - Lovenox 62F with PMHx COPD, active 30 pack-year smoker, dCHF, and history of intubation for CAP presents for BLE edema and shortness of breath. Most likely 2/2 CHF exacerbation given edema, orthopnea, and elevated pro-BNP. Less likely acute COPD exacerbation given no increase in sputum production, minimal wheezing.    #Diastolic Heart Failure  - TTE  - Diuresis  - Monitor I/Os/weights  - Wean off O2    #COPD  - Duonebs, Spiriva  - Continue 40mg prednisone x 5 days  - Monitor off abx  - Discussed smoking cessation. Patient declines any nicotine patches or assistance, prefers to stop cold turkey. Offered assistance if she changes her mind    #Hypervolemic Hyponatremia - Likely 2/2 CHF  - Monitor BMP  - Avoid overcorrection (no more than 6-8 mEq in 24h)  - Diuresis as above  - F/u lytes  - less likely SIADH r/t lung nodule    #Lung nodule - Noted last admission, recommended for follow up CT, not performed  - CT chest    #DVT ppx  - Lovenox

## 2023-01-23 NOTE — H&P ADULT - NSHPOUTPATIENTPROVIDERS_GEN_ALL_CORE
Pulm: Dr. Yanira Putnam  Card: Dr. Yamilet Woodard Pulm: Dr. Yanira Putnam  Card: Dr. Yamilet Marinelli

## 2023-01-23 NOTE — ED ADULT NURSE REASSESSMENT NOTE - NS ED NURSE REASSESS COMMENT FT1
Report received from Mary Carmen RAINEY RN. Pt stable, comfortable and denies pain. Pt on 2L NC sat 92%. All safety measures maintained. Bed locked and in the lowest position.

## 2023-01-23 NOTE — ED PROVIDER NOTE - PROGRESS NOTE DETAILS
Mary Jane, PGY3: Patient breathing comfortably, satting 91-92% on 2 L NC, lungs clear bilaterally. Pending cxr and admission.

## 2023-01-23 NOTE — H&P ADULT - NSHPREVIEWOFSYSTEMS_GEN_ALL_CORE
CONSTITUTIONAL: No fevers, chills, fatigue, dizziness, weakness, unexpected weight change  EYES: No double vision, blurry vision  ENT: No ear pain, nasal congestion, runny nose, sore throat  CV: No chest pain, palpitations  PULM: No cough, shortness of breath  GI: No abdominal pain, nausea, vomiting, diarrhea, constipation  : No dysuria, polyuria, hematuria  SKIN: No rashes, swelling  MSK: No muscle aches  NEURO: No headache, paresthesias  PSYCHIATRIC: Denies suicidal, homicidal ideations. No auditory, visual, tactile hallucinations CONSTITUTIONAL: No fevers, chills, fatigue, dizziness, weakness. + gained 4 lbs  EYES: No double vision, blurry vision  ENT: No ear pain, nasal congestion, runny nose, sore throat  CV: No chest pain, palpitations  PULM: + cough, shortness of breath  GI: No abdominal pain, nausea, vomiting, diarrhea, constipation  : No dysuria, polyuria, hematuria  SKIN: No rashes  MSK: + Bilateral lower extremity swelling. No muscle aches  NEURO: No headache, paresthesias  PSYCHIATRIC: Denies suicidal, homicidal ideations. No auditory, visual, tactile hallucinations

## 2023-01-23 NOTE — H&P ADULT - PROBLEM SELECTOR PLAN 3
History of EF 70-75%, moderate diastolic dysfunction, mild pulmonary pressures on echo 7/2022  - Will obtain echo  - Diuresis with Lasix 40 qD with goal of keeping patient net negative 1L daily  - Strict I&Os  - Daily weights

## 2023-01-23 NOTE — H&P ADULT - HISTORY OF PRESENT ILLNESS
Ms. Augustin is a 61 YO woman with PMH of COPD, active smoking, congestive heart failure, previously required intubation for bacterial pneumonia, who presents with shortness of breath.     · HPI Objective Statement: 61 yo F with hx of COPD not on home O2 presenting with shortness of breath and hypoxia. Patient reports symptoms for the last 24 hours with associated nonproductive cough. Checked her O2 at home which was 70s. Has noticed progressive lower extremity swelling as well. No fevers, chills, known sick contacts.   Ms. Augustin is a 63 YO woman with PMH of COPD, active smoking, congestive heart failure, previously required intubation for bacterial pneumonia, who presents with shortness of breath. She notes that she developed the cough and shortness of breath on Saturday, with bilateral swelling of her lower extremities. She became worried as similar symptoms preceded her intubation for bacterial pneumonia. She did not attempt to use Lasix for symptom relief. She stopped smoking on Saturday, is attempting to quit. No fevers, chills, abdominal pain, change in bathroom habits.

## 2023-01-23 NOTE — H&P ADULT - NSHPPHYSICALEXAM_GEN_ALL_CORE
GENERAL: Vital signs are within normal limits  EYES: Conjunctiva noninjected or pale, sclera anicteric  HENT: NC/AT, moist mucous membranes  NECK: Supple, trachea midline  LUNG: Nonlabored respirations, no wheezes, rales  CV: RRR, Pulses- Radial: 2+ b/l  ABDOMEN: Nondistended, nontender  MSK: No visible deformities, nontender extremities  SKIN: No rashes, bruises  NEURO: AAOx4 (to person, place, time, event), no tremor, steady gait  PSYCH: Normal mood and affect GENERAL: Thin woman, in no acute distress on nasal cannula  EYES: Conjunctiva noninjected or pale, sclera anicteric  HENT: NC/AT, moist mucous membranes  NECK: Supple, trachea midline  LUNG: Nonlabored respirations. + Wheezes bilateral bases and crackles bilateral bases  CV: RRR, Pulses- Radial: 2+ b/l  ABDOMEN: Nondistended, nontender  MSK: + Pitting edema bilaterally up to ankles  SKIN: No rashes, bruises  NEURO: AAOx4 (to person, place, time, event)  PSYCH: Normal mood and affect

## 2023-01-23 NOTE — H&P ADULT - NSHPLABSRESULTS_GEN_ALL_CORE
LABS: When present labs, imaging, and ECG were personally reviewed                          18.9   11.67 )-----------( 250      ( 23 Jan 2023 03:12 )             58.8       01-23    127<L>  |  84<L>  |  12  ----------------------------<  135<H>  4.6   |  32<H>  |  0.43<L>    Ca    10.1      23 Jan 2023 05:14    TPro  7.6  /  Alb  4.8  /  TBili  0.8  /  DBili  x   /  AST  38  /  ALT  18  /  AlkPhos  121<H>  01-23       LIVER FUNCTIONS - ( 23 Jan 2023 03:12 )  Alb: 4.8 g/dL / Pro: 7.6 g/dL / ALK PHOS: 121 U/L / ALT: 18 U/L / AST: 38 U/L / GGT: x                            Lactate Trend            CAPILLARY BLOOD GLUCOSE                RADIOLOGY & ADDITIONAL TESTS:

## 2023-01-23 NOTE — ED ADULT NURSE NOTE - NSIMPLEMENTINTERV_GEN_ALL_ED
. Implemented All Fall Risk Interventions:  [place] to call system. Call bell, personal items and telephone within reach. Instruct patient to call for assistance. Room bathroom lighting operational. Non-slip footwear when patient is off stretcher. Physically safe environment: no spills, clutter or unnecessary equipment. Stretcher in lowest position, wheels locked, appropriate side rails in place. Provide visual cue, wrist band, yellow gown, etc. Monitor gait and stability. Monitor for mental status changes and reorient to person, place, and time. Review medications for side effects contributing to fall risk. Reinforce activity limits and safety measures with patient and family.

## 2023-01-23 NOTE — ED PROVIDER NOTE - ATTENDING CONTRIBUTION TO CARE
Attending MD Hayes:  I performed a history and physical exam of the patient and discussed their management with the resident. I reviewed the resident's note and agree with the documented findings and plan of care. My medical decision making and observations are found above.

## 2023-01-23 NOTE — ED PROVIDER NOTE - BIRTH SEX
Mucinex as needed for congestion over the counter. Push fluids. Rest.  No heavy exercise while on levaquin. Take antibiotic with food. Take a probiotic daily -- yogurt or pill is fine. Follow up in 2 weeks.
Female
29-Jun-2020 15:38

## 2023-01-23 NOTE — H&P ADULT - PROBLEM SELECTOR PLAN 1
+HR and +RR  - Improved with azithromycin/ceftriaxone  - No evidence of pneumonia on CXR  - Will CTM off antibiotics  - Will hold off on fluids given hypervolemia on physical exam

## 2023-01-23 NOTE — ED PROVIDER NOTE - PHYSICAL EXAMINATION
Gen: NAD, AAOx3, non-toxic appearing.  HEENT: NCAT, normal conjunctiva, oral mucosa moist.  Lung: tachypneic, diminished bilaterally with expiratory wheeze diffusely.  CV: regular rate and rhythm. cap refill <2x. peripheral pulses 2+bilaterally. 4+ pitting edema of lower extremities   Abd: soft, ND, NT  MSK: no visible deformities  Neuro: No focal deficits  Skin: Intact  Psych: normal affect

## 2023-01-23 NOTE — H&P ADULT - ASSESSMENT
Ms. Augustin is a 63 YO woman with PMH of COPD, active smoking, congestive heart failure, previously required intubation for bacterial pneumonia, who presents with shortness of breath, likely secondary to COPD exacerbation/CHF exacerbation

## 2023-01-23 NOTE — ED ADULT NURSE NOTE - OBJECTIVE STATEMENT
patient is a 63 y/o F with hx of COPD (not on home O2) and asthma who presents to the ED c/o SOB and b/l LE edema. patient states that for about a week she has been "swollen" and for a few days has been having progressively worsening SOB. patients daughter checked O2 at home and was in the 80s. on arrival to [facility] ED patient hypoxic to 70s on RA with improvement to 90s on NRB. patient states "I feel like I cant take a deep breath", however this improved with O2. denies CP. patient resting in NAD. 18 G IV placed in LAC. placed on CM in sinus tach. ECG obtained. MD Hayes at bedside to assess patient. updated on plan of care. comfort and safety maintained patient is a 63 y/o F with hx of COPD (not on home O2) and asthma who presents to the ED c/o SOB and b/l LE edema. patient states that for 1 day she has been "swollen" and has been having progressively worsening SOB. patients daughter checked O2 at home and was in the 80s. on arrival to [facility] ED patient hypoxic to 70s on RA with improvement to 90s on NRB. patient states "I feel like I cant take a deep breath", however this improved with O2. denies CP. patient resting in NAD. 18 G IV placed in LAC. placed on CM in sinus tach. ECG obtained. MD Hayes at bedside to assess patient. updated on plan of care. comfort and safety maintained

## 2023-01-24 DIAGNOSIS — R91.1 SOLITARY PULMONARY NODULE: ICD-10-CM

## 2023-01-24 LAB
A1C WITH ESTIMATED AVERAGE GLUCOSE RESULT: 5.7 % — HIGH (ref 4–5.6)
ALBUMIN SERPL ELPH-MCNC: 3.6 G/DL — SIGNIFICANT CHANGE UP (ref 3.3–5)
ALP SERPL-CCNC: 92 U/L — SIGNIFICANT CHANGE UP (ref 40–120)
ALT FLD-CCNC: 12 U/L — SIGNIFICANT CHANGE UP (ref 10–45)
ANION GAP SERPL CALC-SCNC: 12 MMOL/L — SIGNIFICANT CHANGE UP (ref 5–17)
AST SERPL-CCNC: 19 U/L — SIGNIFICANT CHANGE UP (ref 10–40)
BILIRUB SERPL-MCNC: 0.3 MG/DL — SIGNIFICANT CHANGE UP (ref 0.2–1.2)
BUN SERPL-MCNC: 13 MG/DL — SIGNIFICANT CHANGE UP (ref 7–23)
CALCIUM SERPL-MCNC: 9.6 MG/DL — SIGNIFICANT CHANGE UP (ref 8.4–10.5)
CHLORIDE SERPL-SCNC: 87 MMOL/L — LOW (ref 96–108)
CO2 SERPL-SCNC: 31 MMOL/L — SIGNIFICANT CHANGE UP (ref 22–31)
CREAT SERPL-MCNC: 0.38 MG/DL — LOW (ref 0.5–1.3)
EGFR: 115 ML/MIN/1.73M2 — SIGNIFICANT CHANGE UP
ESTIMATED AVERAGE GLUCOSE: 117 MG/DL — HIGH (ref 68–114)
GLUCOSE SERPL-MCNC: 80 MG/DL — SIGNIFICANT CHANGE UP (ref 70–99)
HCT VFR BLD CALC: 54.8 % — HIGH (ref 34.5–45)
HGB BLD-MCNC: 17.3 G/DL — HIGH (ref 11.5–15.5)
LEGIONELLA AG UR QL: NEGATIVE — SIGNIFICANT CHANGE UP
MAGNESIUM SERPL-MCNC: 1.9 MG/DL — SIGNIFICANT CHANGE UP (ref 1.6–2.6)
MCHC RBC-ENTMCNC: 29.5 PG — SIGNIFICANT CHANGE UP (ref 27–34)
MCHC RBC-ENTMCNC: 31.6 GM/DL — LOW (ref 32–36)
MCV RBC AUTO: 93.4 FL — SIGNIFICANT CHANGE UP (ref 80–100)
NRBC # BLD: 0 /100 WBCS — SIGNIFICANT CHANGE UP (ref 0–0)
OSMOLALITY SERPL: 276 MOSMOL/KG — LOW (ref 280–301)
PHOSPHATE SERPL-MCNC: 3.6 MG/DL — SIGNIFICANT CHANGE UP (ref 2.5–4.5)
PLATELET # BLD AUTO: 194 K/UL — SIGNIFICANT CHANGE UP (ref 150–400)
POTASSIUM SERPL-MCNC: 4.8 MMOL/L — SIGNIFICANT CHANGE UP (ref 3.5–5.3)
POTASSIUM SERPL-SCNC: 4.8 MMOL/L — SIGNIFICANT CHANGE UP (ref 3.5–5.3)
PROT SERPL-MCNC: 5.8 G/DL — LOW (ref 6–8.3)
RBC # BLD: 5.87 M/UL — HIGH (ref 3.8–5.2)
RBC # FLD: 12.6 % — SIGNIFICANT CHANGE UP (ref 10.3–14.5)
SODIUM SERPL-SCNC: 130 MMOL/L — LOW (ref 135–145)
WBC # BLD: 11.36 K/UL — HIGH (ref 3.8–10.5)
WBC # FLD AUTO: 11.36 K/UL — HIGH (ref 3.8–10.5)

## 2023-01-24 PROCEDURE — 93356 MYOCRD STRAIN IMG SPCKL TRCK: CPT

## 2023-01-24 PROCEDURE — 99233 SBSQ HOSP IP/OBS HIGH 50: CPT

## 2023-01-24 PROCEDURE — 93306 TTE W/DOPPLER COMPLETE: CPT | Mod: 26

## 2023-01-24 PROCEDURE — 76376 3D RENDER W/INTRP POSTPROCES: CPT | Mod: 26

## 2023-01-24 PROCEDURE — 71250 CT THORAX DX C-: CPT | Mod: 26

## 2023-01-24 RX ORDER — SODIUM CHLORIDE 9 MG/ML
4 INJECTION INTRAMUSCULAR; INTRAVENOUS; SUBCUTANEOUS EVERY 12 HOURS
Refills: 0 | Status: DISCONTINUED | OUTPATIENT
Start: 2023-01-24 | End: 2023-01-27

## 2023-01-24 RX ADMIN — SODIUM CHLORIDE 4 MILLILITER(S): 9 INJECTION INTRAMUSCULAR; INTRAVENOUS; SUBCUTANEOUS at 18:13

## 2023-01-24 RX ADMIN — Medication 40 MILLIGRAM(S): at 05:47

## 2023-01-24 RX ADMIN — BUDESONIDE AND FORMOTEROL FUMARATE DIHYDRATE 2 PUFF(S): 160; 4.5 AEROSOL RESPIRATORY (INHALATION) at 05:48

## 2023-01-24 RX ADMIN — ENOXAPARIN SODIUM 40 MILLIGRAM(S): 100 INJECTION SUBCUTANEOUS at 11:22

## 2023-01-24 RX ADMIN — BUDESONIDE AND FORMOTEROL FUMARATE DIHYDRATE 2 PUFF(S): 160; 4.5 AEROSOL RESPIRATORY (INHALATION) at 18:12

## 2023-01-24 NOTE — PROGRESS NOTE ADULT - PROBLEM SELECTOR PLAN 4
Likely hypervolemic hyponatremia in setting of CHF exacerbation. Improved from 122->127 without intervention  - CTM on diuresis  - Obtain urine studies, serum osmolality Likely hypervolemic hyponatremia in setting of CHF exacerbation. Improved from 122->127 without intervention  - Improving. 130 1/24  - CTM

## 2023-01-24 NOTE — PROGRESS NOTE ADULT - PROBLEM SELECTOR PLAN 1
+HR and +RR  - Improved with azithromycin/ceftriaxone  - No evidence of pneumonia on CXR  - Will CTM off antibiotics  - Will hold off on fluids given hypervolemia on physical exam Wheezes heard on exam, CXR consistent with hyperinflation  - Received MgSO4, solumedrol in ER  - Continue Duonebs  - Continue steroids with prednisone 40 qD for 5 day course  - Hold off on antibiotics as no clear signs of pneumonia  - BIPAP at night  - Wean O2 supplementation as tolerated  - CT chest with mucus plugging causing collapse of RLL - started on airway clearance, hypertonic saline  - Will obtain CXR on 1/25 to monitor resolution with above therapies

## 2023-01-24 NOTE — PROGRESS NOTE ADULT - SUBJECTIVE AND OBJECTIVE BOX
Patient is a 62y old  Female who presents with a chief complaint of Shortness of breath (2023 07:48)      SUBJECTIVE / OVERNIGHT EVENTS:    MEDICATIONS  (STANDING):  budesonide 160 MICROgram(s)/formoterol 4.5 MICROgram(s) Inhaler 2 Puff(s) Inhalation two times a day  enoxaparin Injectable 40 milliGRAM(s) SubCutaneous every 24 hours  furosemide    Tablet 40 milliGRAM(s) Oral daily  predniSONE   Tablet 40 milliGRAM(s) Oral daily    MEDICATIONS  (PRN):  albuterol/ipratropium for Nebulization 3 milliLiter(s) Nebulizer every 6 hours PRN Shortness of Breath and/or Wheezing      CAPILLARY BLOOD GLUCOSE        I&O's Summary    2023 07:01  -  2023 07:00  --------------------------------------------------------  IN: 160 mL / OUT: 600 mL / NET: -440 mL        Vital Signs Last 24 Hrs  T(C): 37.2 (2023 05:00), Max: 37.3 (2023 17:04)  T(F): 98.9 (2023 05:00), Max: 99.2 (2023 17:04)  HR: 102 (2023 05:00) (88 - 112)  BP: 115/64 (2023 05:00) (115/64 - 146/88)  BP(mean): 103 (2023 07:36) (103 - 103)  RR: 20 (2023 05:00) (18 - 24)  SpO2: 91% (2023 05:00) (90% - 96%)    Parameters below as of 2023 05:00  Patient On (Oxygen Delivery Method): nasal cannula        PHYSICAL EXAM:  GENERAL: Thin woman, in no acute distress on nasal cannula  EYES: Conjunctiva noninjected or pale, sclera anicteric  HENT: NC/AT, moist mucous membranes  NECK: Supple, trachea midline  LUNG: Nonlabored respirations. + Wheezes bilateral bases and crackles bilateral bases  CV: RRR, Pulses- Radial: 2+ b/l  ABDOMEN: Nondistended, nontender  MSK: + Pitting edema bilaterally up to ankles  SKIN: No rashes, bruises  NEURO: AAOx4 (to person, place, time, event)  PSYCH: Normal mood and affect    LABS:                        18.9   11.67 )-----------( 250      ( 2023 03:12 )             58.8          129<L>  |  86<L>  |  15  ----------------------------<  173<H>  4.1   |  34<H>  |  0.54    Ca    9.4      2023 19:12    TPro  7.6  /  Alb  4.8  /  TBili  0.8  /  DBili  x   /  AST  38  /  ALT  18  /  AlkPhos  121<H>            Urinalysis Basic - ( 2023 14:56 )    Color: Light Yellow / Appearance: Clear / S.014 / pH: x  Gluc: x / Ketone: Negative  / Bili: Negative / Urobili: Negative   Blood: x / Protein: Negative / Nitrite: Negative   Leuk Esterase: Negative / RBC: x / WBC x   Sq Epi: x / Non Sq Epi: x / Bacteria: x        RADIOLOGY & ADDITIONAL TESTS:    Imaging Personally Reviewed:    Consultant(s) Notes Reviewed:      Care Discussed with Consultants/Other Providers:   Patient is a 62y old  Female who presents with a chief complaint of Shortness of breath (2023 07:48)      SUBJECTIVE / OVERNIGHT EVENTS: Patient seen and examined at bedside. Notes her sputum is productive, but otherwise feels much better, decreased shortness of breath.     MEDICATIONS  (STANDING):  budesonide 160 MICROgram(s)/formoterol 4.5 MICROgram(s) Inhaler 2 Puff(s) Inhalation two times a day  enoxaparin Injectable 40 milliGRAM(s) SubCutaneous every 24 hours  furosemide    Tablet 40 milliGRAM(s) Oral daily  predniSONE   Tablet 40 milliGRAM(s) Oral daily    MEDICATIONS  (PRN):  albuterol/ipratropium for Nebulization 3 milliLiter(s) Nebulizer every 6 hours PRN Shortness of Breath and/or Wheezing      CAPILLARY BLOOD GLUCOSE        I&O's Summary    2023 07:01  -  2023 07:00  --------------------------------------------------------  IN: 160 mL / OUT: 600 mL / NET: -440 mL        Vital Signs Last 24 Hrs  T(C): 37.2 (2023 05:00), Max: 37.3 (2023 17:04)  T(F): 98.9 (2023 05:00), Max: 99.2 (2023 17:04)  HR: 102 (2023 05:00) (88 - 112)  BP: 115/64 (2023 05:00) (115/64 - 146/88)  BP(mean): 103 (2023 07:36) (103 - 103)  RR: 20 (2023 05:00) (18 - 24)  SpO2: 91% (2023 05:00) (90% - 96%)    Parameters below as of 2023 05:00  Patient On (Oxygen Delivery Method): nasal cannula        PHYSICAL EXAM:  GENERAL: Thin woman, in no acute distress on nasal cannula  EYES: Conjunctiva noninjected or pale, sclera anicteric  HENT: NC/AT, moist mucous membranes  NECK: Supple, trachea midline  LUNG: Nonlabored respirations. + improved crackles bilateral bases  CV: RRR, Pulses- Radial: 2+ b/l  ABDOMEN: Nondistended, nontender  MSK: + Pitting edema bilaterally up to ankles - improved  SKIN: No rashes, bruises  NEURO: AAOx4 (to person, place, time, event)  PSYCH: Normal mood and affect    LABS:                        18.9   11.67 )-----------( 250      ( 2023 03:12 )             58.8          129<L>  |  86<L>  |  15  ----------------------------<  173<H>  4.1   |  34<H>  |  0.54    Ca    9.4      2023 19:12    TPro  7.6  /  Alb  4.8  /  TBili  0.8  /  DBili  x   /  AST  38  /  ALT  18  /  AlkPhos  121<H>            Urinalysis Basic - ( 2023 14:56 )    Color: Light Yellow / Appearance: Clear / S.014 / pH: x  Gluc: x / Ketone: Negative  / Bili: Negative / Urobili: Negative   Blood: x / Protein: Negative / Nitrite: Negative   Leuk Esterase: Negative / RBC: x / WBC x   Sq Epi: x / Non Sq Epi: x / Bacteria: x        RADIOLOGY & ADDITIONAL TESTS:    Imaging Personally Reviewed:    Consultant(s) Notes Reviewed:      Care Discussed with Consultants/Other Providers:

## 2023-01-24 NOTE — PROGRESS NOTE ADULT - PROBLEM SELECTOR PLAN 2
Wheezes heard on exam, CXR consistent with hyperinflation  - Received MgSO4, solumedrol in ER  - Continue Duonebs  - Continue steroids with prednisone 40 qD for 5 day course  - Hold off on antibiotics as no clear signs of pneumonia  - BIPAP at night  - Wean O2 supplementation as tolerated Wheezes heard on exam, CXR consistent with hyperinflation  - Received MgSO4, solumedrol in ER  - Continue Duonebs  - Continue steroids with prednisone 40 qD for 5 day course  - Hold off on antibiotics as no clear signs of pneumonia  - BIPAP at night  - Wean O2 supplementation as tolerated  - CT chest with mucus plugging causing collapse of RLL - started on chest PT. - CT chest: Stable left lung nodules and peripheral consolidation involving the right apex. Recommend 6 month follow-up.  - Will set up patient with outpatient follow up with pulmonology

## 2023-01-25 LAB
ALBUMIN SERPL ELPH-MCNC: 3.7 G/DL — SIGNIFICANT CHANGE UP (ref 3.3–5)
ALP SERPL-CCNC: 83 U/L — SIGNIFICANT CHANGE UP (ref 40–120)
ALT FLD-CCNC: 9 U/L — LOW (ref 10–45)
ANION GAP SERPL CALC-SCNC: 5 MMOL/L — SIGNIFICANT CHANGE UP (ref 5–17)
ANION GAP SERPL CALC-SCNC: 6 MMOL/L — SIGNIFICANT CHANGE UP (ref 5–17)
AST SERPL-CCNC: 9 U/L — LOW (ref 10–40)
BASE EXCESS BLDV CALC-SCNC: 25.9 MMOL/L — HIGH (ref -2–3)
BILIRUB SERPL-MCNC: 0.3 MG/DL — SIGNIFICANT CHANGE UP (ref 0.2–1.2)
BUN SERPL-MCNC: 14 MG/DL — SIGNIFICANT CHANGE UP (ref 7–23)
BUN SERPL-MCNC: 16 MG/DL — SIGNIFICANT CHANGE UP (ref 7–23)
CALCIUM SERPL-MCNC: 9.6 MG/DL — SIGNIFICANT CHANGE UP (ref 8.4–10.5)
CALCIUM SERPL-MCNC: 9.7 MG/DL — SIGNIFICANT CHANGE UP (ref 8.4–10.5)
CHLORIDE SERPL-SCNC: 84 MMOL/L — LOW (ref 96–108)
CHLORIDE SERPL-SCNC: 87 MMOL/L — LOW (ref 96–108)
CO2 BLDV-SCNC: 58 MMOL/L — HIGH (ref 22–26)
CO2 SERPL-SCNC: 40 MMOL/L — HIGH (ref 22–31)
CO2 SERPL-SCNC: 44 MMOL/L — HIGH (ref 22–31)
CREAT SERPL-MCNC: 0.41 MG/DL — LOW (ref 0.5–1.3)
CREAT SERPL-MCNC: 0.43 MG/DL — LOW (ref 0.5–1.3)
EGFR: 111 ML/MIN/1.73M2 — SIGNIFICANT CHANGE UP
EGFR: 113 ML/MIN/1.73M2 — SIGNIFICANT CHANGE UP
GAS PNL BLDV: SIGNIFICANT CHANGE UP
GLUCOSE SERPL-MCNC: 161 MG/DL — HIGH (ref 70–99)
GLUCOSE SERPL-MCNC: 94 MG/DL — SIGNIFICANT CHANGE UP (ref 70–99)
HCO3 BLDV-SCNC: 56 MMOL/L — CRITICAL HIGH (ref 22–29)
HCT VFR BLD CALC: 51.6 % — HIGH (ref 34.5–45)
HGB BLD-MCNC: 15.8 G/DL — HIGH (ref 11.5–15.5)
LACTATE BLDV-MCNC: 1.4 MMOL/L — SIGNIFICANT CHANGE UP (ref 0.5–2)
MAGNESIUM SERPL-MCNC: 1.9 MG/DL — SIGNIFICANT CHANGE UP (ref 1.6–2.6)
MCHC RBC-ENTMCNC: 29.1 PG — SIGNIFICANT CHANGE UP (ref 27–34)
MCHC RBC-ENTMCNC: 30.6 GM/DL — LOW (ref 32–36)
MCV RBC AUTO: 95 FL — SIGNIFICANT CHANGE UP (ref 80–100)
NRBC # BLD: 0 /100 WBCS — SIGNIFICANT CHANGE UP (ref 0–0)
PCO2 BLDV: 82 MMHG — HIGH (ref 39–42)
PH BLDV: 7.44 — HIGH (ref 7.32–7.43)
PHOSPHATE SERPL-MCNC: 2.2 MG/DL — LOW (ref 2.5–4.5)
PLATELET # BLD AUTO: 189 K/UL — SIGNIFICANT CHANGE UP (ref 150–400)
PO2 BLDV: 131 MMHG — HIGH (ref 25–45)
POTASSIUM SERPL-MCNC: 4.2 MMOL/L — SIGNIFICANT CHANGE UP (ref 3.5–5.3)
POTASSIUM SERPL-MCNC: 4.4 MMOL/L — SIGNIFICANT CHANGE UP (ref 3.5–5.3)
POTASSIUM SERPL-SCNC: 4.2 MMOL/L — SIGNIFICANT CHANGE UP (ref 3.5–5.3)
POTASSIUM SERPL-SCNC: 4.4 MMOL/L — SIGNIFICANT CHANGE UP (ref 3.5–5.3)
PROT SERPL-MCNC: 5.4 G/DL — LOW (ref 6–8.3)
RBC # BLD: 5.43 M/UL — HIGH (ref 3.8–5.2)
RBC # FLD: 12.5 % — SIGNIFICANT CHANGE UP (ref 10.3–14.5)
S PNEUM AG UR QL: NEGATIVE — SIGNIFICANT CHANGE UP
SAO2 % BLDV: 99.7 % — HIGH (ref 67–88)
SODIUM SERPL-SCNC: 133 MMOL/L — LOW (ref 135–145)
SODIUM SERPL-SCNC: 133 MMOL/L — LOW (ref 135–145)
WBC # BLD: 10.77 K/UL — HIGH (ref 3.8–10.5)
WBC # FLD AUTO: 10.77 K/UL — HIGH (ref 3.8–10.5)

## 2023-01-25 PROCEDURE — 99233 SBSQ HOSP IP/OBS HIGH 50: CPT

## 2023-01-25 PROCEDURE — 71046 X-RAY EXAM CHEST 2 VIEWS: CPT | Mod: 26

## 2023-01-25 RX ADMIN — SODIUM CHLORIDE 4 MILLILITER(S): 9 INJECTION INTRAMUSCULAR; INTRAVENOUS; SUBCUTANEOUS at 05:50

## 2023-01-25 RX ADMIN — Medication 40 MILLIGRAM(S): at 05:46

## 2023-01-25 RX ADMIN — ENOXAPARIN SODIUM 40 MILLIGRAM(S): 100 INJECTION SUBCUTANEOUS at 13:06

## 2023-01-25 RX ADMIN — Medication 63.75 MILLIMOLE(S): at 13:06

## 2023-01-25 RX ADMIN — BUDESONIDE AND FORMOTEROL FUMARATE DIHYDRATE 2 PUFF(S): 160; 4.5 AEROSOL RESPIRATORY (INHALATION) at 05:48

## 2023-01-25 RX ADMIN — Medication 1200 MILLIGRAM(S): at 18:54

## 2023-01-25 RX ADMIN — Medication 3 MILLILITER(S): at 18:51

## 2023-01-25 RX ADMIN — BUDESONIDE AND FORMOTEROL FUMARATE DIHYDRATE 2 PUFF(S): 160; 4.5 AEROSOL RESPIRATORY (INHALATION) at 18:51

## 2023-01-25 RX ADMIN — SODIUM CHLORIDE 4 MILLILITER(S): 9 INJECTION INTRAMUSCULAR; INTRAVENOUS; SUBCUTANEOUS at 18:51

## 2023-01-25 NOTE — DIETITIAN INITIAL EVALUATION ADULT - ADD RECOMMEND
1) Discontinue current diet order; DASH/TLC, recommend liberalizing diet to low sodium diet to optimize PO intake.  2) RD to add on mighty shakes 2x/day to optimize PO intake.   3) Encourage adequate intake of meals and supplements to optimize PO intake.   4) Monitor PO intake/tolerance, weights, labs, hydration status, bowels, and skin integrity.

## 2023-01-25 NOTE — DIETITIAN INITIAL EVALUATION ADULT - REASON FOR ADMISSION
Per chart, "Ms. Augustin is a 61 YO woman with PMH of COPD, active smoking, congestive heart failure, previously required intubation for bacterial pneumonia, who presents with shortness of breath, likely secondary to COPD exacerbation/CHF exacerbation."

## 2023-01-25 NOTE — DIETITIAN INITIAL EVALUATION ADULT - ENERGY INTAKE
Pt reports good appetite and PO intake since admission, is currently ordered for DASH/TLC diet. Pt made aware of menu ordering procedures in house, pt has no food preferences at this time, made aware RD remains available.

## 2023-01-25 NOTE — PROGRESS NOTE ADULT - PROBLEM SELECTOR PLAN 3
History of EF 70-75%, moderate diastolic dysfunction, mild pulmonary pressures on echo 7/2022  - Will obtain echo  - Diuresis with Lasix 40 qD with goal of keeping patient net negative 1L daily  - Strict I&Os  - Daily weights History of EF 70-75%, moderate diastolic dysfunction, mild pulmonary pressures on echo 7/2022  - Will obtain echo  - Diuresis with Lasix 40 qD with goal of keeping patient net negative 1L daily  - Strict I&Os  - Daily weights  - Set up follow up with outpatient cardiology

## 2023-01-25 NOTE — PROGRESS NOTE ADULT - SUBJECTIVE AND OBJECTIVE BOX
Patient is a 62y old  Female who presents with a chief complaint of Shortness of breath (2023 07:20)      SUBJECTIVE / OVERNIGHT EVENTS:    MEDICATIONS  (STANDING):  budesonide 160 MICROgram(s)/formoterol 4.5 MICROgram(s) Inhaler 2 Puff(s) Inhalation two times a day  enoxaparin Injectable 40 milliGRAM(s) SubCutaneous every 24 hours  furosemide    Tablet 40 milliGRAM(s) Oral daily  predniSONE   Tablet 40 milliGRAM(s) Oral daily  sodium chloride 3%  Inhalation 4 milliLiter(s) Inhalation every 12 hours    MEDICATIONS  (PRN):  albuterol/ipratropium for Nebulization 3 milliLiter(s) Nebulizer every 6 hours PRN Shortness of Breath and/or Wheezing      CAPILLARY BLOOD GLUCOSE        I&O's Summary    2023 07:01  -  2023 07:00  --------------------------------------------------------  IN: 780 mL / OUT: 0 mL / NET: 780 mL        Vital Signs Last 24 Hrs  T(C): 36.8 (2023 04:36), Max: 37.2 (2023 19:40)  T(F): 98.2 (2023 04:36), Max: 99 (2023 19:40)  HR: 97 (2023 05:55) (87 - 102)  BP: 141/73 (2023 04:36) (104/60 - 141/73)  BP(mean): --  RR: 24 (2023 04:36) (18 - 24)  SpO2: 92% (2023 05:55) (90% - 95%)    Parameters below as of 2023 04:36  Patient On (Oxygen Delivery Method): BiPAP/CPAP        PHYSICAL EXAM:  GENERAL: Thin woman, in no acute distress on nasal cannula  EYES: Conjunctiva noninjected or pale, sclera anicteric  HENT: NC/AT, moist mucous membranes  NECK: Supple, trachea midline  LUNG: Nonlabored respirations. + improved crackles bilateral bases  CV: RRR, Pulses- Radial: 2+ b/l  ABDOMEN: Nondistended, nontender  MSK: + Pitting edema bilaterally up to ankles - improved  SKIN: No rashes, bruises  NEURO: AAOx4 (to person, place, time, event)  PSYCH: Normal mood and affect    LABS:                        15.8   10.77 )-----------( 189      ( 2023 06:29 )             51.6          133<L>  |  87<L>  |  14  ----------------------------<  94  4.2   |  40<H>  |  0.43<L>    Ca    9.6      2023 06:29  Phos  2.2       Mg     1.9         TPro  5.4<L>  /  Alb  3.7  /  TBili  0.3  /  DBili  x   /  AST  9<L>  /  ALT  9<L>  /  AlkPhos  83            Urinalysis Basic - ( 2023 14:56 )    Color: Light Yellow / Appearance: Clear / S.014 / pH: x  Gluc: x / Ketone: Negative  / Bili: Negative / Urobili: Negative   Blood: x / Protein: Negative / Nitrite: Negative   Leuk Esterase: Negative / RBC: x / WBC x   Sq Epi: x / Non Sq Epi: x / Bacteria: x        RADIOLOGY & ADDITIONAL TESTS:    Imaging Personally Reviewed:    Consultant(s) Notes Reviewed:      Care Discussed with Consultants/Other Providers:   Patient is a 62y old  Female who presents with a chief complaint of Shortness of breath (2023 07:20)      SUBJECTIVE / OVERNIGHT EVENTS: No acute events overnight. Feels shortness of breath has improved and that legs look much better. Still with productive cough    MEDICATIONS  (STANDING):  budesonide 160 MICROgram(s)/formoterol 4.5 MICROgram(s) Inhaler 2 Puff(s) Inhalation two times a day  enoxaparin Injectable 40 milliGRAM(s) SubCutaneous every 24 hours  furosemide    Tablet 40 milliGRAM(s) Oral daily  predniSONE   Tablet 40 milliGRAM(s) Oral daily  sodium chloride 3%  Inhalation 4 milliLiter(s) Inhalation every 12 hours    MEDICATIONS  (PRN):  albuterol/ipratropium for Nebulization 3 milliLiter(s) Nebulizer every 6 hours PRN Shortness of Breath and/or Wheezing      CAPILLARY BLOOD GLUCOSE        I&O's Summary    2023 07:01  -  2023 07:00  --------------------------------------------------------  IN: 780 mL / OUT: 0 mL / NET: 780 mL        Vital Signs Last 24 Hrs  T(C): 36.8 (2023 04:36), Max: 37.2 (2023 19:40)  T(F): 98.2 (2023 04:36), Max: 99 (2023 19:40)  HR: 97 (2023 05:55) (87 - 102)  BP: 141/73 (2023 04:36) (104/60 - 141/73)  BP(mean): --  RR: 24 (2023 04:36) (18 - 24)  SpO2: 92% (2023 05:55) (90% - 95%)    Parameters below as of 2023 04:36  Patient On (Oxygen Delivery Method): BiPAP/CPAP        PHYSICAL EXAM:  GENERAL: Thin woman, in no acute distress on nasal cannula  EYES: Conjunctiva noninjected or pale, sclera anicteric  HENT: NC/AT, moist mucous membranes  NECK: Supple, trachea midline  LUNG: Nonlabored respirations. + diminished sounds at bases  CV: RRR, Pulses- Radial: 2+ b/l  ABDOMEN: Nondistended, nontender  MSK: Minimal bilateral pitting edema  SKIN: No rashes, bruises  NEURO: AAOx4 (to person, place, time, event)  PSYCH: Normal mood and affect    LABS:                        15.8   10.77 )-----------( 189      ( 2023 06:29 )             51.6          133<L>  |  87<L>  |  14  ----------------------------<  94  4.2   |  40<H>  |  0.43<L>    Ca    9.6      2023 06:29  Phos  2.2       Mg     1.9         TPro  5.4<L>  /  Alb  3.7  /  TBili  0.3  /  DBili  x   /  AST  9<L>  /  ALT  9<L>  /  AlkPhos  83            Urinalysis Basic - ( 2023 14:56 )    Color: Light Yellow / Appearance: Clear / S.014 / pH: x  Gluc: x / Ketone: Negative  / Bili: Negative / Urobili: Negative   Blood: x / Protein: Negative / Nitrite: Negative   Leuk Esterase: Negative / RBC: x / WBC x   Sq Epi: x / Non Sq Epi: x / Bacteria: x        RADIOLOGY & ADDITIONAL TESTS:    Imaging Personally Reviewed:    Consultant(s) Notes Reviewed:      Care Discussed with Consultants/Other Providers:

## 2023-01-25 NOTE — DIETITIAN INITIAL EVALUATION ADULT - PERTINENT MEDS FT
MEDICATIONS  (STANDING):  budesonide 160 MICROgram(s)/formoterol 4.5 MICROgram(s) Inhaler 2 Puff(s) Inhalation two times a day  enoxaparin Injectable 40 milliGRAM(s) SubCutaneous every 24 hours  guaiFENesin ER 1200 milliGRAM(s) Oral every 12 hours  predniSONE   Tablet 40 milliGRAM(s) Oral daily  sodium chloride 3%  Inhalation 4 milliLiter(s) Inhalation every 12 hours    MEDICATIONS  (PRN):  albuterol/ipratropium for Nebulization 3 milliLiter(s) Nebulizer every 6 hours PRN Shortness of Breath and/or Wheezing

## 2023-01-25 NOTE — PROGRESS NOTE ADULT - ASSESSMENT
Ms. Augustin is a 63 YO woman with PMH of COPD, active smoking, congestive heart failure, previously required intubation for bacterial pneumonia, who presents with shortness of breath, likely secondary to COPD exacerbation/CHF exacerbation   Ms. Augustin is a 61 YO woman with PMH of COPD, active smoking, congestive heart failure, previously required intubation for bacterial pneumonia, who presents with shortness of breath, likely secondary to COPD exacerbation/CHF exacerbation

## 2023-01-25 NOTE — DIETITIAN INITIAL EVALUATION ADULT - PHYSCIAL ASSESSMENT
Pt appears thin. Reports she is "skinny" at baseline.  Weight Hx Per:  - Source: patient   - UBW: 116 pounds   - Reported weight changes: Pt reports she has weighed 116 pounds since high school. No unintentional weight loss noted.     Weight Hx Per [facility]:   - 49 kg (7/22/2021)  - 55.3 kg (12/21/2021)  - 49.9 kg (6/16/2022)  - 51.7 kg (9/15/2022)    Current Admission Weights:  - Dosing weight: 120 pounds (54.4 kg) 1/23  - Daily weight: none documented thus far     Weight Change:  - none    **  Will continue to monitor weight trends as available/able.   IBW: 130 pounds   %IBW: 92%/other (specify)

## 2023-01-25 NOTE — DIETITIAN INITIAL EVALUATION ADULT - PERTINENT LABORATORY DATA
01-25    133<L>  |  84<L>  |  16  ----------------------------<  161<H>  4.4   |  44<H>  |  0.41<L>    Ca    9.7      25 Jan 2023 13:04  Phos  2.2     01-25  Mg     1.9     01-25    TPro  5.4<L>  /  Alb  3.7  /  TBili  0.3  /  DBili  x   /  AST  9<L>  /  ALT  9<L>  /  AlkPhos  83  01-25  A1C with Estimated Average Glucose Result: 5.7 % (01-24-23 @ 06:51)

## 2023-01-25 NOTE — DIETITIAN INITIAL EVALUATION ADULT - NSFNSGIIOFT_GEN_A_CORE
- Pt denies nausea, vomiting, diarrhea, or constipation.   - Last BM: PTA; not currently ordered for bowel regimen. Of note, pt reports she is has bowel movements every 3-4 days at baseline.

## 2023-01-25 NOTE — DIETITIAN INITIAL EVALUATION ADULT - OTHER INFO
Nutrition-related concerns:   - Pt noted Hyponatremic, improving, per chart.   - Ordered for Deltasone in house.   - On 1L NC.  - Phos noted low, now resolved.     Endo: HbA1c 5.7% from previous admission

## 2023-01-25 NOTE — DIETITIAN INITIAL EVALUATION ADULT - ORAL INTAKE PTA/DIET HISTORY
Pt reports good appetite/PO intake PTA. Reports consuming 2 meals a day with snacks in between. Pt reports she does the grocery shopping and cooking at home, orders take out 2-3 times a month.   - NKFA/intolerances reported.   - No therapeutic restrictions reported.  - Micronutrient/Other supplementation: Zinc, Vitamin D  - Protein-energy supplementation: none  - No hx of chewing/swallowing difficulties.

## 2023-01-25 NOTE — PROGRESS NOTE ADULT - PROBLEM SELECTOR PLAN 2
- CT chest: Stable left lung nodules and peripheral consolidation involving the right apex. Recommend 6 month follow-up.  - Will set up patient with outpatient follow up with pulmonology

## 2023-01-25 NOTE — PROGRESS NOTE ADULT - PROBLEM SELECTOR PLAN 1
Wheezes heard on exam, CXR consistent with hyperinflation  - Received MgSO4, solumedrol in ER  - Continue Duonebs  - Continue steroids with prednisone 40 qD for 5 day course  - Hold off on antibiotics as no clear signs of pneumonia  - BIPAP at night  - Wean O2 supplementation as tolerated  - CT chest with mucus plugging causing collapse of RLL - started on airway clearance, hypertonic saline  - Will obtain CXR on 1/25 to monitor resolution with above therapies Wheezes heard on exam, CXR consistent with hyperinflation  - Received MgSO4, solumedrol in ER  - Continue Duonebs  - Continue steroids with prednisone 40 qD for 5 day course  - Hold off on antibiotics as no clear signs of pneumonia  - BIPAP at night  - Wean O2 supplementation as tolerated  - CT chest with mucus plugging causing collapse of RLL - started on airway clearance, hypertonic saline  - Will obtain CXR on 1/25 to monitor resolution with above therapies - pending read

## 2023-01-26 ENCOUNTER — TRANSCRIPTION ENCOUNTER (OUTPATIENT)
Age: 63
End: 2023-01-26

## 2023-01-26 LAB
ANION GAP SERPL CALC-SCNC: 8 MMOL/L — SIGNIFICANT CHANGE UP (ref 5–17)
BUN SERPL-MCNC: 11 MG/DL — SIGNIFICANT CHANGE UP (ref 7–23)
CALCIUM SERPL-MCNC: 10.3 MG/DL — SIGNIFICANT CHANGE UP (ref 8.4–10.5)
CHLORIDE SERPL-SCNC: 88 MMOL/L — LOW (ref 96–108)
CO2 SERPL-SCNC: 39 MMOL/L — HIGH (ref 22–31)
CREAT SERPL-MCNC: 0.36 MG/DL — LOW (ref 0.5–1.3)
EGFR: 116 ML/MIN/1.73M2 — SIGNIFICANT CHANGE UP
GLUCOSE SERPL-MCNC: 91 MG/DL — SIGNIFICANT CHANGE UP (ref 70–99)
HCT VFR BLD CALC: 53.5 % — HIGH (ref 34.5–45)
HGB BLD-MCNC: 16.4 G/DL — HIGH (ref 11.5–15.5)
MAGNESIUM SERPL-MCNC: 2 MG/DL — SIGNIFICANT CHANGE UP (ref 1.6–2.6)
MCHC RBC-ENTMCNC: 29.1 PG — SIGNIFICANT CHANGE UP (ref 27–34)
MCHC RBC-ENTMCNC: 30.7 GM/DL — LOW (ref 32–36)
MCV RBC AUTO: 94.9 FL — SIGNIFICANT CHANGE UP (ref 80–100)
NRBC # BLD: 0 /100 WBCS — SIGNIFICANT CHANGE UP (ref 0–0)
PHOSPHATE SERPL-MCNC: 2.8 MG/DL — SIGNIFICANT CHANGE UP (ref 2.5–4.5)
PLATELET # BLD AUTO: 169 K/UL — SIGNIFICANT CHANGE UP (ref 150–400)
POTASSIUM SERPL-MCNC: 4.5 MMOL/L — SIGNIFICANT CHANGE UP (ref 3.5–5.3)
POTASSIUM SERPL-SCNC: 4.5 MMOL/L — SIGNIFICANT CHANGE UP (ref 3.5–5.3)
RBC # BLD: 5.64 M/UL — HIGH (ref 3.8–5.2)
RBC # FLD: 12.4 % — SIGNIFICANT CHANGE UP (ref 10.3–14.5)
SODIUM SERPL-SCNC: 135 MMOL/L — SIGNIFICANT CHANGE UP (ref 135–145)
WBC # BLD: 10.02 K/UL — SIGNIFICANT CHANGE UP (ref 3.8–10.5)
WBC # FLD AUTO: 10.02 K/UL — SIGNIFICANT CHANGE UP (ref 3.8–10.5)

## 2023-01-26 PROCEDURE — 99233 SBSQ HOSP IP/OBS HIGH 50: CPT

## 2023-01-26 RX ORDER — CHLORHEXIDINE GLUCONATE 213 G/1000ML
1 SOLUTION TOPICAL DAILY
Refills: 0 | Status: DISCONTINUED | OUTPATIENT
Start: 2023-01-26 | End: 2023-01-27

## 2023-01-26 RX ADMIN — SODIUM CHLORIDE 4 MILLILITER(S): 9 INJECTION INTRAMUSCULAR; INTRAVENOUS; SUBCUTANEOUS at 06:17

## 2023-01-26 RX ADMIN — Medication 40 MILLIGRAM(S): at 06:17

## 2023-01-26 RX ADMIN — Medication 1200 MILLIGRAM(S): at 06:17

## 2023-01-26 RX ADMIN — SODIUM CHLORIDE 4 MILLILITER(S): 9 INJECTION INTRAMUSCULAR; INTRAVENOUS; SUBCUTANEOUS at 17:41

## 2023-01-26 RX ADMIN — BUDESONIDE AND FORMOTEROL FUMARATE DIHYDRATE 2 PUFF(S): 160; 4.5 AEROSOL RESPIRATORY (INHALATION) at 06:19

## 2023-01-26 RX ADMIN — ENOXAPARIN SODIUM 40 MILLIGRAM(S): 100 INJECTION SUBCUTANEOUS at 13:12

## 2023-01-26 RX ADMIN — BUDESONIDE AND FORMOTEROL FUMARATE DIHYDRATE 2 PUFF(S): 160; 4.5 AEROSOL RESPIRATORY (INHALATION) at 17:41

## 2023-01-26 RX ADMIN — Medication 3 MILLILITER(S): at 06:17

## 2023-01-26 NOTE — DISCHARGE NOTE PROVIDER - CARE PROVIDERS DIRECT ADDRESSES
[email or web address]

## 2023-01-26 NOTE — PROGRESS NOTE ADULT - SUBJECTIVE AND OBJECTIVE BOX
Patient is a 62y old  Female who presents with a chief complaint of Per chart, "Ms. Augustin is a 63 YO woman with PMH of COPD, active smoking, congestive heart failure, previously required intubation for bacterial pneumonia, who presents with shortness of breath, likely secondary to COPD exacerbation/CHF exacerbation."     (25 Jan 2023 15:20)      SUBJECTIVE / OVERNIGHT EVENTS:    MEDICATIONS  (STANDING):  budesonide 160 MICROgram(s)/formoterol 4.5 MICROgram(s) Inhaler 2 Puff(s) Inhalation two times a day  enoxaparin Injectable 40 milliGRAM(s) SubCutaneous every 24 hours  guaiFENesin ER 1200 milliGRAM(s) Oral every 12 hours  sodium chloride 3%  Inhalation 4 milliLiter(s) Inhalation every 12 hours    MEDICATIONS  (PRN):  albuterol/ipratropium for Nebulization 3 milliLiter(s) Nebulizer every 6 hours PRN Shortness of Breath and/or Wheezing      CAPILLARY BLOOD GLUCOSE        I&O's Summary    25 Jan 2023 07:01  -  26 Jan 2023 07:00  --------------------------------------------------------  IN: 1210 mL / OUT: 900 mL / NET: 310 mL        Vital Signs Last 24 Hrs  T(C): 36.6 (26 Jan 2023 04:27), Max: 37.3 (25 Jan 2023 13:39)  T(F): 97.9 (26 Jan 2023 04:27), Max: 99.2 (25 Jan 2023 13:39)  HR: 98 (26 Jan 2023 05:26) (96 - 107)  BP: 134/78 (26 Jan 2023 04:27) (117/52 - 134/78)  BP(mean): --  RR: 22 (26 Jan 2023 04:27) (18 - 22)  SpO2: 92% (26 Jan 2023 05:26) (89% - 95%)    Parameters below as of 26 Jan 2023 04:27  Patient On (Oxygen Delivery Method): BiPAP/CPAP        PHYSICAL EXAM:  GENERAL: Thin woman, in no acute distress on nasal cannula  EYES: Conjunctiva noninjected or pale, sclera anicteric  HENT: NC/AT, moist mucous membranes  NECK: Supple, trachea midline  LUNG: Nonlabored respirations. + diminished sounds at bases  CV: RRR, Pulses- Radial: 2+ b/l  ABDOMEN: Nondistended, nontender  MSK: Minimal bilateral pitting edema  SKIN: No rashes, bruises  NEURO: AAOx4 (to person, place, time, event)  PSYCH: Normal mood and affect    LABS:                        16.4   10.02 )-----------( 169      ( 26 Jan 2023 06:34 )             53.5      01-25    133<L>  |  84<L>  |  16  ----------------------------<  161<H>  4.4   |  44<H>  |  0.41<L>    Ca    9.7      25 Jan 2023 13:04  Phos  2.2     01-25  Mg     1.9     01-25    TPro  5.4<L>  /  Alb  3.7  /  TBili  0.3  /  DBili  x   /  AST  9<L>  /  ALT  9<L>  /  AlkPhos  83  01-25              RADIOLOGY & ADDITIONAL TESTS:    Imaging Personally Reviewed:    Consultant(s) Notes Reviewed:      Care Discussed with Consultants/Other Providers:   Patient is a 62y old  Female who presents with a chief complaint of Per chart, "shortness of breath."     (25 Jan 2023 15:20)      SUBJECTIVE / OVERNIGHT EVENTS: No acute events overnight. Patient feels that she has been able to expectorate more mucus. She denies other symptoms.    MEDICATIONS  (STANDING):  budesonide 160 MICROgram(s)/formoterol 4.5 MICROgram(s) Inhaler 2 Puff(s) Inhalation two times a day  enoxaparin Injectable 40 milliGRAM(s) SubCutaneous every 24 hours  guaiFENesin ER 1200 milliGRAM(s) Oral every 12 hours  sodium chloride 3%  Inhalation 4 milliLiter(s) Inhalation every 12 hours    MEDICATIONS  (PRN):  albuterol/ipratropium for Nebulization 3 milliLiter(s) Nebulizer every 6 hours PRN Shortness of Breath and/or Wheezing      CAPILLARY BLOOD GLUCOSE        I&O's Summary    25 Jan 2023 07:01  -  26 Jan 2023 07:00  --------------------------------------------------------  IN: 1210 mL / OUT: 900 mL / NET: 310 mL        Vital Signs Last 24 Hrs  T(C): 36.6 (26 Jan 2023 04:27), Max: 37.3 (25 Jan 2023 13:39)  T(F): 97.9 (26 Jan 2023 04:27), Max: 99.2 (25 Jan 2023 13:39)  HR: 98 (26 Jan 2023 05:26) (96 - 107)  BP: 134/78 (26 Jan 2023 04:27) (117/52 - 134/78)  BP(mean): --  RR: 22 (26 Jan 2023 04:27) (18 - 22)  SpO2: 92% (26 Jan 2023 05:26) (89% - 95%)    Parameters below as of 26 Jan 2023 04:27  Patient On (Oxygen Delivery Method): BiPAP/CPAP        PHYSICAL EXAM:  GENERAL: Thin woman, in no acute distress on nasal cannula  EYES: Conjunctiva noninjected or pale, sclera anicteric  HENT: NC/AT, moist mucous membranes  NECK: Supple, trachea midline  LUNG: Nonlabored respirations. Poor overall air movement, but equal in R and L bases  CV: RRR, Pulses- Radial: 2+ b/l  ABDOMEN: Nondistended, nontender  MSK: Minimal bilateral pitting edema  SKIN: No rashes, bruises  NEURO: AAOx4 (to person, place, time, event)  PSYCH: Normal mood and affect    LABS:                        16.4   10.02 )-----------( 169      ( 26 Jan 2023 06:34 )             53.5      01-25    133<L>  |  84<L>  |  16  ----------------------------<  161<H>  4.4   |  44<H>  |  0.41<L>    Ca    9.7      25 Jan 2023 13:04  Phos  2.2     01-25  Mg     1.9     01-25    TPro  5.4<L>  /  Alb  3.7  /  TBili  0.3  /  DBili  x   /  AST  9<L>  /  ALT  9<L>  /  AlkPhos  83  01-25              RADIOLOGY & ADDITIONAL TESTS:    Imaging Personally Reviewed:    Consultant(s) Notes Reviewed:      Care Discussed with Consultants/Other Providers:

## 2023-01-26 NOTE — PROGRESS NOTE ADULT - PROBLEM SELECTOR PLAN 4
Likely hypervolemic hyponatremia in setting of CHF exacerbation. Improved from 122->127 without intervention  - Improving. 130 1/24  - CTM

## 2023-01-26 NOTE — DISCHARGE NOTE PROVIDER - NSDCFUADDAPPT_GEN_ALL_CORE_FT
APPTS ARE READY TO BE MADE: [X] YES    Best Family or Patient Contact (if needed): Graciela Augustin (patient) [phone]    Additional Information about above appointments (if needed):    1: Cardiology: Dr. Yamilet Marinelli   2: Pulmonology: Dr. Yanira Putnam  3: PCP near [place] (no established PCP)    Other comments or requests:    1: Cardiology: Dr. Yamilet Marinelli - February 28th 1:15  [facility]  2: Pulmonology: Dr. Yanira Putnam January 31st 11:00 AM - [address] [facility] [address] [place]   3: PCP near [place] (no established PCP)    Please make sure to get a CT scan in 6 months to monitor the status of your lung nodules.   1: Cardiology: Dr. Yamilet Marinelli - February 28th 1:15  [facility]  2: Pulmonology: Dr. Yanira Putnam January 31st 11:00 AM - [address] [facility] [address] [place]   3: PCP near [place] (no established PCP)    Please make sure to get a CT scan in 6 months to monitor the status of your lung nodules.      3 attempts were made to reach patient, which have been unsuccessful. Unable to leave voicemail on 1/28, 1/29, and 1/30. Will await call back from patient to coordinate follow up care.

## 2023-01-26 NOTE — DISCHARGE NOTE PROVIDER - NSDCCPTREATMENT_GEN_ALL_CORE_FT
PRINCIPAL PROCEDURE  Procedure: CT chest wo con  Findings and Treatment: IMPRESSION:  New occlusion of the right lower lobe bronchus resulting in right lower   lobar collapse, likely a mucous plug.  Interval resolution of multiple areas of groundglass opacities.  Stable left lung nodules and peripheral consolidation involving the right   apex. Recommend 6 month follow-up.  Increased small right and new small left pleural effusions. Unchanged   partially calcified right pleural thickening.        SECONDARY PROCEDURE  Procedure: Transthoracic echo  Findings and Treatment: EF: 69%  Conclusions:  1. Thickened mitral valve. Mild mitral regurgitation.  2. Normal trileaflet aortic valve. No aortic valve  regurgitation seen.  3.Mildly dilated left atrium.  LA volume index = 40 cc/m2.  4. Normal left ventricular systolic function. No segmental  wall motion abnormalities.  5. Strain imaging was performed with a HR of 112 bpm and a  BP of 104/60. Global L. Strain= - 20.5%.  6.Moderate diastolic dysfunction (Stage II).  7. Right ventricular enlargement with normal right  ventricular systolic function. The Rv measures 4.6cm at the  base.  *** Compared with echocardiogram of 6/17/2022, there has  been an interval increase inthe size of the right  ventricle.

## 2023-01-26 NOTE — DISCHARGE NOTE PROVIDER - NSFOLLOWUPCLINICS_GEN_ALL_ED_FT
[facility] [place]  Internal Medicine  [address] [place] [address]  Phone: [phone]  Fax: [phone]  Follow Up Time: 2 weeks

## 2023-01-26 NOTE — DISCHARGE NOTE PROVIDER - NSDCFUSCHEDAPPT_GEN_ALL_CORE_FT
Yanira Putnam  [facility] Physician [facility]  PULMMED [address] [facility]  Scheduled Appointment: 01/31/2023    Yamilet Marinelli  [facility] Physician [facility]  CARDIOLOGY [address]  Scheduled Appointment: 02/28/2023

## 2023-01-26 NOTE — DISCHARGE NOTE PROVIDER - PROVIDER TOKENS
PROVIDER:[TOKEN:[[id number]],FOLLOWUP:[2 weeks],ESTABLISHEDPATIENT:[T]],PROVIDER:[TOKEN:[[id number]],FOLLOWUP:[2 weeks]]

## 2023-01-26 NOTE — DISCHARGE NOTE PROVIDER - CARE PROVIDER_API CALL
none Yamilet Marinelli)  Cardiovascular Disease; Interventional Cardiology  [address] [place] [address]  Phone: [phone]  Fax: [phone]  Established Patient  Follow Up Time: 2 weeks    Yanira Putnam)  Critical Care Medicine; Pulmonary Disease  [address] [facility] [facility] [address]  [place] [address]  Phone: [phone]  Fax: [phone]  Follow Up Time: 2 weeks

## 2023-01-26 NOTE — DISCHARGE NOTE PROVIDER - NSDCCPCAREPLAN_GEN_ALL_CORE_FT
PRINCIPAL DISCHARGE DIAGNOSIS  Diagnosis: Acute on chronic diastolic congestive heart failure  Assessment and Plan of Treatment:       SECONDARY DISCHARGE DIAGNOSES  Diagnosis: COPD exacerbation  Assessment and Plan of Treatment:      PRINCIPAL DISCHARGE DIAGNOSIS  Diagnosis: Acute on chronic diastolic congestive heart failure  Assessment and Plan of Treatment: You came into the hospital feeling short of breath, and when we examined you, we found that your heart was not able to efficiently pump blood because you were holding onto a lot of fluid that had accumulated in your legs, making them swollen. We gave you a water pill (Lasix) to help you get rid of this liquid, which helped your breathing. We would like you to follow up with Dr. Marinelli in 1-2 weeks after discharge to monitor your heart function. Please return to the ED if you still experience shortness of breath despite your home oxygen or if you develop chest pain.      SECONDARY DISCHARGE DIAGNOSES  Diagnosis: COPD exacerbation  Assessment and Plan of Treatment: You came into the hospital feeling short of breath, and with a productive cough. We did a CT scan of your chest and found that mucus had blocked air from entering the right lower lobe of your lungs. We gave you Mucinex, saline and chest physical therapy, which helped your lungs clear the mucus and bring air back to your right lung. Your CT scan also showed some nodules which are stable compared to a CT scan from your last hospital stay but should be evaluated for risk of cancer. You should follow up with Dr. Putnam in 1-2 weeks after dishcarge to monitor your lung function as well as these nodules.     PRINCIPAL DISCHARGE DIAGNOSIS  Diagnosis: COPD exacerbation  Assessment and Plan of Treatment: You came into the hospital feeling short of breath, and with a productive cough. We did a CT scan of your chest and found that mucus had blocked air from entering the right lower lobe of your lungs. You received three days of prednisone. Additionally we gave you Mucinex, saline and chest physical therapy, which helped your lungs clear the mucus and bring air back to your right lung. Your CT scan also showed some nodules which are stable compared to a CT scan from your last hospital stay but should be evaluated for risk of cancer. Please make sure to obtain a repeat CT chest scan in 6 months. You should follow up with Dr. Putnam on 1/31/23 11:00 AM - [address] [place] [address] to monitor your lung function as well as these nodules. Please return to the hospital if you have shortness of breath, increased cough with sputum production, fevers, chills, or any other symptoms beyond your baseline that are concerning.      SECONDARY DISCHARGE DIAGNOSES  Diagnosis: Acute on chronic diastolic congestive heart failure  Assessment and Plan of Treatment: You came into the hospital feeling short of breath, and when we examined you, we found that your heart was not able to efficiently pump blood because you were holding onto a lot of fluid that had accumulated in your legs, making them swollen. We gave you a water pill (Lasix) to help you get rid of this liquid, which helped your breathing. We will also prescribe lasix on discharge that you can take as needed if you find that your legs are getting much more swollen compared to your baseline. We would like you to follow up with Dr. Marinelli on February 28th 1:15 PM [address] [place] [address] to monitor your heart function. Please return to the ED if you still experience shortness of breath despite your home oxygen or if you develop chest pain, or any other symptoms beyond your baseline that are concerning.

## 2023-01-26 NOTE — DISCHARGE NOTE PROVIDER - HOSPITAL COURSE
Ms. Augustin is a 61 YO woman with PMH of COPD, active smoking, congestive heart failure, previously required intubation for bacterial pneumonia, who presented with shortness of breath, likely secondary to COPD exacerbation/CHF exacerbation. Echo showed moderate diastolic dysfunction, with increase in size of right ventricle, and she was diuresed with 40 mg Lasix, with improvement of symptoms. She was also noted to have decreased breath sounds on RLL, and CT scan showed mucus plugging with collapse of RLL. She was treated with Mucinex, bronchodilators, hypertonic saline and chest PT, with improvement of air entry into RLL.     Despite treatment of her underlying conditions, she continued to require oxygen supplementation and will be discharged on home O2.    Ms. Augustin is a 63 YO woman with PMH of COPD, active smoking, congestive heart failure, previously required intubation for bacterial pneumonia, who presented with shortness of breath, likely secondary to COPD exacerbation/CHF exacerbation. Echo showed moderate diastolic dysfunction, with increase in size of right ventricle, and she was diuresed with 40 mg Lasix, with improvement of symptoms. She was also noted to have decreased breath sounds on RLL, and CT scan showed mucus plugging with collapse of RLL. She was treated with Mucinex, bronchodilators, hypertonic saline and chest PT, with improvement of air entry into RLL.     Despite treatment of her underlying conditions, she continued to require oxygen supplementation and will be discharged on home O2 and PRN Lasix. She will follow up with cardiology and pulmonology.   Ms. Augustin is a 63 YO woman with PMH of COPD, active smoking, congestive heart failure, previously required intubation for bacterial pneumonia, who presented with shortness of breath, likely secondary to COPD exacerbation. Echo showed moderate diastolic dysfunction, with increase in size of right ventricle, and she was diuresed with 40 mg Lasix and 3 days of prednisone, with improvement of symptoms. She was also noted to have decreased breath sounds on RLL, and CT scan showed mucus plugging with collapse of RLL. She was treated with Mucinex, bronchodilators, hypertonic saline and chest PT, with improvement of air entry into RLL.     Despite treatment of her underlying conditions, she continued to require oxygen supplementation and will be discharged on home O2 and PRN Lasix. She will follow up with cardiology and pulmonology. At the time of discharge, she was medically optimized for discharge home with close follow up with pulmonology and cardiology and PCP.

## 2023-01-26 NOTE — DISCHARGE NOTE PROVIDER - NSDCMRMEDTOKEN_GEN_ALL_CORE_FT
albuterol 2.5 mg/3 mL (0.083%) inhalation solution: 3 milliliter(s) inhaled , As Needed  Symbicort 160 mcg-4.5 mcg/inh inhalation aerosol: 2 puff(s) inhaled 2 times a day   albuterol 2.5 mg/3 mL (0.083%) inhalation solution: 3 milliliter(s) inhaled , As Needed  Lasix 20 mg oral tablet: 1 tab(s) orally once a day, As Needed for weight gain or swelling in feet  Symbicort 160 mcg-4.5 mcg/inh inhalation aerosol: 2 puff(s) inhaled 2 times a day   albuterol 2.5 mg/3 mL (0.083%) inhalation solution: 3 milliliter(s) inhaled 4 times a day, As Needed  Lasix 20 mg oral tablet: 1 tab(s) orally once a day, As Needed for weight gain or swelling in feet  Symbicort 160 mcg-4.5 mcg/inh inhalation aerosol: 2 puff(s) inhaled 2 times a day

## 2023-01-26 NOTE — PROGRESS NOTE ADULT - PROBLEM SELECTOR PLAN 3
History of EF 70-75%, moderate diastolic dysfunction, mild pulmonary pressures on echo 7/2022  - Will obtain echo  - Diuresis with Lasix 40 qD with goal of keeping patient net negative 1L daily  - Strict I&Os  - Daily weights  - Set up follow up with outpatient cardiology

## 2023-01-26 NOTE — PROGRESS NOTE ADULT - PROBLEM SELECTOR PLAN 1
Wheezes heard on exam, CXR consistent with hyperinflation  - Received MgSO4, solumedrol in ER  - Continue Duonebs  - Continue steroids with prednisone 40 qD for 5 day course  - Hold off on antibiotics as no clear signs of pneumonia  - BIPAP at night  - Wean O2 supplementation as tolerated  - CT chest with mucus plugging causing collapse of RLL - started on airway clearance, hypertonic saline  - Will obtain CXR on 1/25 to monitor resolution with above therapies - pending read Wheezes heard on exam, CXR consistent with hyperinflation  - Received MgSO4, solumedrol in ER  - Continue Duonebs  - Continue steroids with prednisone 40 qD for 5 day course  - Hold off on antibiotics as no clear signs of pneumonia  - BIPAP at night  - Wean O2 supplementation as tolerated  - CT chest with mucus plugging causing collapse of RLL - started on airway clearance, hypertonic saline now with improved air entry in RLL  - Will obtain CXR on 1/25 to monitor resolution with above therapies - pending read

## 2023-01-26 NOTE — DISCHARGE NOTE PROVIDER - ATTENDING DISCHARGE PHYSICAL EXAMINATION:
GENERAL: Thin woman, in no acute distress on nasal cannula  EYES: Conjunctiva noninjected or pale, sclera anicteric  HENT: NC/AT, moist mucous membranes  NECK: Supple, trachea midline  LUNG: Nonlabored respirations. Poor overall air movement, but equal breath sounds throughout including RLL  CV: RRR, Pulses- Radial: 2+ b/l  ABDOMEN: Nondistended, nontender  MSK: No bilateral pitting edema  SKIN: No rashes, bruises  NEURO: AAOx4 (to person, place, time, event)  PSYCH: Normal mood and affect

## 2023-01-27 ENCOUNTER — TRANSCRIPTION ENCOUNTER (OUTPATIENT)
Age: 63
End: 2023-01-27

## 2023-01-27 VITALS — HEART RATE: 88 BPM | OXYGEN SATURATION: 95 %

## 2023-01-27 PROBLEM — I50.32 CHRONIC DIASTOLIC (CONGESTIVE) HEART FAILURE: Chronic | Status: ACTIVE | Noted: 2023-01-23

## 2023-01-27 PROCEDURE — 94660 CPAP INITIATION&MGMT: CPT

## 2023-01-27 PROCEDURE — 81003 URINALYSIS AUTO W/O SCOPE: CPT

## 2023-01-27 PROCEDURE — 80053 COMPREHEN METABOLIC PANEL: CPT

## 2023-01-27 PROCEDURE — 84484 ASSAY OF TROPONIN QUANT: CPT

## 2023-01-27 PROCEDURE — 84540 ASSAY OF URINE/UREA-N: CPT

## 2023-01-27 PROCEDURE — 80048 BASIC METABOLIC PNL TOTAL CA: CPT

## 2023-01-27 PROCEDURE — 83880 ASSAY OF NATRIURETIC PEPTIDE: CPT

## 2023-01-27 PROCEDURE — 83935 ASSAY OF URINE OSMOLALITY: CPT

## 2023-01-27 PROCEDURE — 93306 TTE W/DOPPLER COMPLETE: CPT

## 2023-01-27 PROCEDURE — 85027 COMPLETE CBC AUTOMATED: CPT

## 2023-01-27 PROCEDURE — 83930 ASSAY OF BLOOD OSMOLALITY: CPT

## 2023-01-27 PROCEDURE — 99285 EMERGENCY DEPT VISIT HI MDM: CPT

## 2023-01-27 PROCEDURE — 71045 X-RAY EXAM CHEST 1 VIEW: CPT

## 2023-01-27 PROCEDURE — 99239 HOSP IP/OBS DSCHRG MGMT >30: CPT

## 2023-01-27 PROCEDURE — 71046 X-RAY EXAM CHEST 2 VIEWS: CPT

## 2023-01-27 PROCEDURE — 83036 HEMOGLOBIN GLYCOSYLATED A1C: CPT

## 2023-01-27 PROCEDURE — 83605 ASSAY OF LACTIC ACID: CPT

## 2023-01-27 PROCEDURE — 82330 ASSAY OF CALCIUM: CPT

## 2023-01-27 PROCEDURE — 76376 3D RENDER W/INTRP POSTPROCES: CPT

## 2023-01-27 PROCEDURE — 87899 AGENT NOS ASSAY W/OPTIC: CPT

## 2023-01-27 PROCEDURE — 36415 COLL VENOUS BLD VENIPUNCTURE: CPT

## 2023-01-27 PROCEDURE — 84132 ASSAY OF SERUM POTASSIUM: CPT

## 2023-01-27 PROCEDURE — 85018 HEMOGLOBIN: CPT

## 2023-01-27 PROCEDURE — 82570 ASSAY OF URINE CREATININE: CPT

## 2023-01-27 PROCEDURE — 82803 BLOOD GASES ANY COMBINATION: CPT

## 2023-01-27 PROCEDURE — 82435 ASSAY OF BLOOD CHLORIDE: CPT

## 2023-01-27 PROCEDURE — 96374 THER/PROPH/DIAG INJ IV PUSH: CPT

## 2023-01-27 PROCEDURE — 84295 ASSAY OF SERUM SODIUM: CPT

## 2023-01-27 PROCEDURE — 0225U NFCT DS DNA&RNA 21 SARSCOV2: CPT

## 2023-01-27 PROCEDURE — 84300 ASSAY OF URINE SODIUM: CPT

## 2023-01-27 PROCEDURE — 85014 HEMATOCRIT: CPT

## 2023-01-27 PROCEDURE — 96375 TX/PRO/DX INJ NEW DRUG ADDON: CPT

## 2023-01-27 PROCEDURE — 71250 CT THORAX DX C-: CPT

## 2023-01-27 PROCEDURE — 83735 ASSAY OF MAGNESIUM: CPT

## 2023-01-27 PROCEDURE — 84100 ASSAY OF PHOSPHORUS: CPT

## 2023-01-27 PROCEDURE — 94640 AIRWAY INHALATION TREATMENT: CPT

## 2023-01-27 PROCEDURE — 93356 MYOCRD STRAIN IMG SPCKL TRCK: CPT

## 2023-01-27 PROCEDURE — 82947 ASSAY GLUCOSE BLOOD QUANT: CPT

## 2023-01-27 PROCEDURE — 85025 COMPLETE CBC W/AUTO DIFF WBC: CPT

## 2023-01-27 PROCEDURE — 87449 NOS EACH ORGANISM AG IA: CPT

## 2023-01-27 RX ORDER — FUROSEMIDE 40 MG
1 TABLET ORAL
Qty: 30 | Refills: 0
Start: 2023-01-27 | End: 2023-02-25

## 2023-01-27 RX ORDER — CHLORHEXIDINE GLUCONATE 213 G/1000ML
1 SOLUTION TOPICAL DAILY
Refills: 0 | Status: DISCONTINUED | OUTPATIENT
Start: 2023-01-27 | End: 2023-01-27

## 2023-01-27 RX ORDER — ALBUTEROL 90 UG/1
3 AEROSOL, METERED ORAL
Qty: 0 | Refills: 0 | DISCHARGE

## 2023-01-27 RX ADMIN — SODIUM CHLORIDE 4 MILLILITER(S): 9 INJECTION INTRAMUSCULAR; INTRAVENOUS; SUBCUTANEOUS at 06:24

## 2023-01-27 RX ADMIN — BUDESONIDE AND FORMOTEROL FUMARATE DIHYDRATE 2 PUFF(S): 160; 4.5 AEROSOL RESPIRATORY (INHALATION) at 06:24

## 2023-01-27 RX ADMIN — ENOXAPARIN SODIUM 40 MILLIGRAM(S): 100 INJECTION SUBCUTANEOUS at 12:02

## 2023-01-27 NOTE — PROGRESS NOTE ADULT - ATTENDING COMMENTS
62F with PMHx COPD, active 30 pack-year smoker, dCHF, and history of intubation for CAP presents for BLE edema and shortness of breath. Most likely 2/2 CHF exacerbation given edema, orthopnea, and elevated pro-BNP. TTE with RV enlargement and stage II diastolic dysfunction. CT showing likely RLL mucus plug, likely contributing to dyspnea.    #Diastolic Heart Failure  - Now appears euvolemic  - Monitor I/Os/weights  - Wean off O2  - Outpatient cardiology follow up with Dr. Marinelli    #COPD  - Duonebs, Spiriva  - Continue 40mg prednisone x 5 days  - Monitor off abx  - Discussed smoking cessation. Patient declines any nicotine patches or assistance, prefers to stop cold turkey. Offered assistance if she changes her mind  - Airway clearance and chest PT for mucus plug, follow up chest XR after clearance to assess for resolution., awaiting read. On exam, better air entry suggesting lung has expanded, though still requiring O2. Plan for home with Home O2 tomorrow if not dyspneic on exertion.  - Outpatient pulm follow up with Dr. Putnam    #Metabolic acidosis   - Improving now off diuresis  - Monitor    #Hypervolemic Hyponatremia - Resolved    #Lung nodule - Noted last admission, recommended for follow up CT, not performed  - CT chest with stable nodules  - Repeat in 6 mo    #DVT ppx  - Lovenox
62F with PMHx COPD, active 30 pack-year smoker, dCHF, and history of intubation for CAP presents for BLE edema and shortness of breath. Most likely 2/2 CHF exacerbation given edema, orthopnea, and elevated pro-BNP. TTE with RV enlargement and stage II diastolic dysfunction. CT showing likely RLL mucus plug, likely contributing to dyspnea.    #Diastolic Heart Failure  - TTE   - Now appears euvolemic  - Monitor I/Os/weights  - Wean off O2    #COPD  - Duoneaneesh, Spiriva  - Continue 40mg prednisone x 5 days  - Monitor off abx  - Discussed smoking cessation. Patient declines any nicotine patches or assistance, prefers to stop cold turkey. Offered assistance if she changes her mind  - Airway clearance and chest PT for mucus plug, follow up chest XR after clearance to assess for resolution., awaiting read. On exam, decreased breath sounds on RLL suggest lobe not fully expanded. If unable to wean off O2 by tomorrow, will consult pulm for possible bronch/BAL.    #Metabolic acidosis   - Bicarb 44  - Hold further diuresis and monitor    #Hypervolemic Hyponatremia - Likely 2/2 CHF. Resolving with diuresis  - Monitor BMP  - Avoid overcorrection)  - Diuresis now on hold  - F/u lytes  - less likely SIADH r/t lung nodule    #Lung nodule - Noted last admission, recommended for follow up CT, not performed  - CT chest with stable nodules    #DVT ppx  - Lovenox
62F with PMHx COPD, active 30 pack-year smoker, dCHF, and history of intubation for CAP presents for BLE edema and shortness of breath. Most likely 2/2 CHF exacerbation given edema, orthopnea, and elevated pro-BNP. TTE with RV enlargement and stage II diastolic dysfunction. CT showing likely RLL mucus plug, likely contributing to dyspnea.    #Diastolic Heart Failure with RV enlargement in the setting of hypoxia  - Euvolemic  - Monitor I/Os/weights  - Wean off O2  - Outpatient cardiology follow up with Dr. Marinelli  - Will DC with PRN lasix 20mg    #COPD  - Duonebs, Spiriva  - s/p 40mg prednisone x 5 days  - Monitor off abx  - Discussed smoking cessation. Patient declines any nicotine patches or assistance, prefers to stop cold turkey. Offered assistance if she changes her mind  - Airway clearance and chest PT for mucus plug, follow up chest XR after clearance to assess for resolution., awaiting read. On exam, better air entry suggesting lung has expanded, though still requiring O2. Plan for home with Home O2 today given no dyspnea on exertion with O2  - DC with home inhalers  - Outpatient pulm follow up with Dr. Putnam    #Metabolic acidosis   - Improved    #Hypervolemic Hyponatremia - Resolved    #Lung nodule - Noted last admission, recommended for follow up CT, not performed  - CT chest with stable nodules  - Repeat in 6 mo    DC home today. 35 minutes spent on DC planning.
62F with PMHx COPD, active 30 pack-year smoker, dCHF, and history of intubation for CAP presents for BLE edema and shortness of breath. Most likely 2/2 CHF exacerbation given edema, orthopnea, and elevated pro-BNP. TTE with RV enlargement and stage II diastolic dysfunction. CT showing likely RLL mucus plug, likely contributing to dyspnea.    #Diastolic Heart Failure  - TTE   - Diuresis with 40mg IV Lasix, likely transition to PO tomorrow  - Monitor I/Os/weights  - Wean off O2    #COPD  - Duonebs, Spiriva  - Continue 40mg prednisone x 5 days  - Monitor off abx  - Discussed smoking cessation. Patient declines any nicotine patches or assistance, prefers to stop cold turkey. Offered assistance if she changes her mind  - Airway clearance and chest PT for mucus plug, follow up chest XR after clearance to assess for resolution.    #Hypervolemic Hyponatremia - Likely 2/2 CHF. Resolving with diuresis  - Monitor BMP  - Avoid overcorrection (no more than 6-8 mEq in 24h)  - Diuresis as above  - F/u lytes  - less likely SIADH r/t lung nodule    #Lung nodule - Noted last admission, recommended for follow up CT, not performed  - CT chest with stable nodules    #DVT ppx  - Lovenox

## 2023-01-27 NOTE — CHART NOTE - NSCHARTNOTEFT_GEN_A_CORE
Ms. Augustin will require home oxygen based on a diagnosis of heart failure and COPD. Acute conditions and exacerbations have been treated, are currently resolved, and patient is in a chronic, stable state. Room air saturation at rest is 85%.    Discussed with attending Dr. Peyman Patrick MD (NPI [id number]) who is in agreement.

## 2023-01-27 NOTE — DISCHARGE NOTE NURSING/CASE MANAGEMENT/SOCIAL WORK - NSDCPEEMAIL_GEN_ALL_CORE
[facility] for Tobacco Control email [email or web address]

## 2023-01-27 NOTE — DISCHARGE NOTE NURSING/CASE MANAGEMENT/SOCIAL WORK - NSDCPEWEB_GEN_ALL_CORE
[facility] for Tobacco Control website --- [email or web address] website --- [email or web address]

## 2023-01-27 NOTE — PROGRESS NOTE ADULT - SUBJECTIVE AND OBJECTIVE BOX
Patient is a 62y old  Female who presents with a chief complaint of Shortness of breath (26 Jan 2023 16:54)      SUBJECTIVE / OVERNIGHT EVENTS:    MEDICATIONS  (STANDING):  budesonide 160 MICROgram(s)/formoterol 4.5 MICROgram(s) Inhaler 2 Puff(s) Inhalation two times a day  chlorhexidine 2% Cloths 1 Application(s) Topical daily  enoxaparin Injectable 40 milliGRAM(s) SubCutaneous every 24 hours  sodium chloride 3%  Inhalation 4 milliLiter(s) Inhalation every 12 hours    MEDICATIONS  (PRN):  albuterol/ipratropium for Nebulization 3 milliLiter(s) Nebulizer every 6 hours PRN Shortness of Breath and/or Wheezing      CAPILLARY BLOOD GLUCOSE        I&O's Summary    26 Jan 2023 07:01  -  27 Jan 2023 07:00  --------------------------------------------------------  IN: 1080 mL / OUT: 1200 mL / NET: -120 mL        Vital Signs Last 24 Hrs  T(C): 37 (27 Jan 2023 04:08), Max: 37.6 (26 Jan 2023 13:01)  T(F): 98.6 (27 Jan 2023 04:08), Max: 99.6 (26 Jan 2023 13:01)  HR: 95 (27 Jan 2023 04:08) (88 - 106)  BP: 131/83 (27 Jan 2023 04:08) (112/65 - 134/68)  BP(mean): --  RR: 20 (27 Jan 2023 04:08) (18 - 25)  SpO2: 95% (27 Jan 2023 04:08) (80% - 96%)    Parameters below as of 27 Jan 2023 04:08  Patient On (Oxygen Delivery Method): nasal cannula        PHYSICAL EXAM:  GENERAL: Thin woman, in no acute distress on nasal cannula  EYES: Conjunctiva noninjected or pale, sclera anicteric  HENT: NC/AT, moist mucous membranes  NECK: Supple, trachea midline  LUNG: Nonlabored respirations. Poor overall air movement, but equal in R and L bases  CV: RRR, Pulses- Radial: 2+ b/l  ABDOMEN: Nondistended, nontender  MSK: Minimal bilateral pitting edema  SKIN: No rashes, bruises  NEURO: AAOx4 (to person, place, time, event)  PSYCH: Normal mood and affect    LABS:                        16.4   10.02 )-----------( 169      ( 26 Jan 2023 06:34 )             53.5      01-26    135  |  88<L>  |  11  ----------------------------<  91  4.5   |  39<H>  |  0.36<L>    Ca    10.3      26 Jan 2023 06:34  Phos  2.8     01-26  Mg     2.0     01-26                RADIOLOGY & ADDITIONAL TESTS:    Imaging Personally Reviewed:    Consultant(s) Notes Reviewed:      Care Discussed with Consultants/Other Providers:   Patient is a 62y old  Female who presents with a chief complaint of Shortness of breath (26 Jan 2023 16:54)      SUBJECTIVE / OVERNIGHT EVENTS: No acute events overnight. Feels improved, mainly wants to go home.     MEDICATIONS  (STANDING):  budesonide 160 MICROgram(s)/formoterol 4.5 MICROgram(s) Inhaler 2 Puff(s) Inhalation two times a day  chlorhexidine 2% Cloths 1 Application(s) Topical daily  enoxaparin Injectable 40 milliGRAM(s) SubCutaneous every 24 hours  sodium chloride 3%  Inhalation 4 milliLiter(s) Inhalation every 12 hours    MEDICATIONS  (PRN):  albuterol/ipratropium for Nebulization 3 milliLiter(s) Nebulizer every 6 hours PRN Shortness of Breath and/or Wheezing      CAPILLARY BLOOD GLUCOSE        I&O's Summary    26 Jan 2023 07:01  -  27 Jan 2023 07:00  --------------------------------------------------------  IN: 1080 mL / OUT: 1200 mL / NET: -120 mL        Vital Signs Last 24 Hrs  T(C): 37 (27 Jan 2023 04:08), Max: 37.6 (26 Jan 2023 13:01)  T(F): 98.6 (27 Jan 2023 04:08), Max: 99.6 (26 Jan 2023 13:01)  HR: 95 (27 Jan 2023 04:08) (88 - 106)  BP: 131/83 (27 Jan 2023 04:08) (112/65 - 134/68)  BP(mean): --  RR: 20 (27 Jan 2023 04:08) (18 - 25)  SpO2: 95% (27 Jan 2023 04:08) (80% - 96%)    Parameters below as of 27 Jan 2023 04:08  Patient On (Oxygen Delivery Method): nasal cannula        PHYSICAL EXAM:  GENERAL: Thin woman, in no acute distress on nasal cannula  EYES: Conjunctiva noninjected or pale, sclera anicteric  HENT: NC/AT, moist mucous membranes  NECK: Supple, trachea midline  LUNG: Nonlabored respirations. Poor overall air movement  CV: RRR, Pulses- Radial: 2+ b/l  ABDOMEN: Nondistended, nontender  MSK: Minimal bilateral pitting edema  SKIN: No rashes, bruises  NEURO: AAOx4 (to person, place, time, event)  PSYCH: Normal mood and affect    LABS:                        16.4   10.02 )-----------( 169      ( 26 Jan 2023 06:34 )             53.5      01-26    135  |  88<L>  |  11  ----------------------------<  91  4.5   |  39<H>  |  0.36<L>    Ca    10.3      26 Jan 2023 06:34  Phos  2.8     01-26  Mg     2.0     01-26                RADIOLOGY & ADDITIONAL TESTS:    Imaging Personally Reviewed:    Consultant(s) Notes Reviewed:      Care Discussed with Consultants/Other Providers:

## 2023-01-27 NOTE — DISCHARGE NOTE NURSING/CASE MANAGEMENT/SOCIAL WORK - NSDCFUADDAPPT_GEN_ALL_CORE_FT
1: Cardiology: Dr. Yamilet Marinelli - February 28th 1:15  [facility]  2: Pulmonology: Dr. Yainra Putnam January 31st 11:00 AM - [address] [place]   3: PCP near [place] (no established PCP)    Please make sure to get a CT scan in 6 months to monitor the status of your lung nodules.

## 2023-01-27 NOTE — PROGRESS NOTE ADULT - PROBLEM SELECTOR PLAN 4
Likely hypervolemic hyponatremia in setting of CHF exacerbation. Improved from 122->127 without intervention  - Improving. 130 1/24  - CTM Resolved

## 2023-01-27 NOTE — PROGRESS NOTE ADULT - PROBLEM SELECTOR PLAN 3
History of EF 70-75%, moderate diastolic dysfunction, mild pulmonary pressures on echo 7/2022  - Will obtain echo  - Diuresis with Lasix 40 qD with goal of keeping patient net negative 1L daily  - Strict I&Os  - Daily weights  - Set up follow up with outpatient cardiology History of EF 70-75%, moderate diastolic dysfunction, mild pulmonary pressures on echo 6/2022  - Repeat echo with interval increase in RV size  - Discontinued Lasix as patient developed contraction alkalosis  - Strict I&Os  - Daily weights  - Set up follow up with outpatient cardiology History of EF 70-75%, moderate diastolic dysfunction, mild pulmonary pressures on echo 6/2022  - Repeat echo with interval increase in RV size  - Discontinued Lasix as patient developed contraction alkalosis, will give PRN Lasix for patient to use on discharge  - Strict I&Os  - Daily weights  - Set up follow up with outpatient cardiology

## 2023-01-27 NOTE — PROGRESS NOTE ADULT - PROBLEM SELECTOR PLAN 1
Wheezes heard on exam, CXR consistent with hyperinflation  - Received MgSO4, solumedrol in ER  - Continue Duonebs  - Continue steroids with prednisone 40 qD for 5 day course  - Hold off on antibiotics as no clear signs of pneumonia  - BIPAP at night  - Wean O2 supplementation as tolerated  - CT chest with mucus plugging causing collapse of RLL - started on airway clearance, hypertonic saline now with improved air entry in RLL  - Will obtain CXR on 1/25 to monitor resolution with above therapies - pending read Wheezes heard on exam, CXR consistent with hyperinflation  - Received MgSO4, solumedrol in ER  - Continue Duonebs  - S/p prednisone 40 qD for 3 day course  - Hold off on antibiotics as no clear signs of pneumonia  - CT chest with mucus plugging causing collapse of RLL - started on airway clearance, hypertonic saline now with improved air entry in RLL  - Patient unable to be weaned off oxygen without desaturating, will send home with home O2

## 2023-01-27 NOTE — PROGRESS NOTE ADULT - PROBLEM SELECTOR PLAN 6
DVT prophylaxis: Lovenox  Diet: DASH diet  Dispo: Pending course DVT prophylaxis: Lovenox  Diet: DASH diet  Dispo: Home with home O2

## 2023-01-31 ENCOUNTER — APPOINTMENT (OUTPATIENT)
Dept: PULMONOLOGY | Facility: CLINIC | Age: 63
End: 2023-01-31
Payer: COMMERCIAL

## 2023-01-31 VITALS
HEIGHT: 66 IN | WEIGHT: 115 LBS | BODY MASS INDEX: 18.48 KG/M2 | HEART RATE: 100 BPM | DIASTOLIC BLOOD PRESSURE: 64 MMHG | RESPIRATION RATE: 15 BRPM | TEMPERATURE: 97 F | OXYGEN SATURATION: 91 % | SYSTOLIC BLOOD PRESSURE: 131 MMHG

## 2023-01-31 DIAGNOSIS — J18.9 PNEUMONIA, UNSPECIFIED ORGANISM: ICD-10-CM

## 2023-01-31 DIAGNOSIS — J90 PLEURAL EFFUSION, NOT ELSEWHERE CLASSIFIED: ICD-10-CM

## 2023-01-31 PROCEDURE — 94799 UNLISTED PULMONARY SVC/PX: CPT

## 2023-01-31 PROCEDURE — 82803 BLOOD GASES ANY COMBINATION: CPT

## 2023-01-31 PROCEDURE — 99215 OFFICE O/P EST HI 40 MIN: CPT | Mod: 25

## 2023-01-31 PROCEDURE — ZZZZZ: CPT

## 2023-01-31 PROCEDURE — 36600 WITHDRAWAL OF ARTERIAL BLOOD: CPT | Mod: 59

## 2023-01-31 RX ORDER — ALBUTEROL SULFATE 2.5 MG/3ML
(2.5 MG/3ML) SOLUTION RESPIRATORY (INHALATION)
Qty: 2 | Refills: 2 | Status: ACTIVE | COMMUNITY
Start: 2023-01-31 | End: 1900-01-01

## 2023-01-31 RX ORDER — SODIUM CHLORIDE FOR INHALATION 3 %
3 VIAL, NEBULIZER (ML) INHALATION
Qty: 1 | Refills: 3 | Status: ACTIVE | COMMUNITY
Start: 2023-01-31 | End: 1900-01-01

## 2023-01-31 NOTE — ASSESSMENT
[FreeTextEntry1] : 62F with pmhx  of BEATA (on cpap), COPD referred to me after COPD exacerbation which required hospitalization.  CT at that time showed patchy and multifocal GGO in upper lobes and a new o.7cm nodule.  She was recently admitted at [facility] due to hypoxemia, increased shortness of breath and productive cough, SpO2 88%. Initially started on antibiotics then stopped and diuresed. Was initiated on supplemented O2 2lpm 24/7. Has not been using cpap since her discharge. Continues Symbicort 160 1 puffs bid, stopped incruse. albuterol neb prn and lasix 20mg daily. She has stopped smoking.  \par \par She is feeling better. SpO2 87% at rest on RA. SpO2 95% on 2LPM at rest. \par \par CT chest showed an occlusion of the right lower lobe bronchus resulting in complete lobar collapse of the right lower lobe. There are multiple left lung nodules unchanged in size, largest 0.8 cm in the left lower lobe (3-114) and 1 cm in the left lower lobe (3-83). There is a small right pleural effusion and Right pleural thickening. There is a new left-sided pleural effusion. Right apical subpleural consolidation is stable. Stable emphysematous changes.\par This was personally reviewed by me.\par \par ON PE crackles at right upper lung and right base. There is no wheezing. Mild LE nonpitting edema, LE cool to touch, pedal pulses intact. Has scoliosis.\par \par Prior PFTs  show very severe obstruction. LUng volumes are normal.  DLCO is moderately reduced. \par EOT showed desaturation to 85% after walking 5 minutes at 1.2mph. \par \par Echo in June, 2022  showed moderate diastolic dysfunction, normal RV size and function and mild pulmonary hypertension.  PA systolic pressure was 49.  \par  \par Impression:  chronic respiratory failure with mucous plugging and increased secretions.  BEATA on CPAP.\par \par Plan\par 1- BIPAP titration with transcutaneous co2. Pt instructed not to use cpap 3 days prior to testing for most accurate results. \par 2. [facility] and provide supplies. \par 3. start airway clearance regimen: nebulized albuterol, followed by hypersal, followed by aerobika x BID.\par 4. sputum cultures \par 5. ABGs today- shows 7.35/85/62. \par 6. Levaquin 500 x 5 days \par 7. Incruse daily and symbicort 2 puffs BID, refills sent. \par 8. cmp today\par 8. CT chest after 2/28 and follow up in the office. \par \par Patient is ill with multiple comorbidities, chronic respiratory failure, BEATA \par Moderate to high level decision making.

## 2023-01-31 NOTE — HISTORY OF PRESENT ILLNESS
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 She was treated with IV levaquin and prednisone 40mg x 5 days, and discharged on Suplemental oxygen at 2LPM. On initial evaluation in July 2022 she was using Incruse Ellipta daily and albuterol prn, once a week xopenex neb tx. \par She has documented hx diastolic heart failure ( was last seen by cardio 2018 and rx'ed Lasix prn).\par \par At that visit, SaO2 at rest 92%, HR 103bpm regular, desaturated to 89% after walking about 450ft. \par \par CT chest 2010 showed a small partially loculated right pleural effusion with thickening of visceral and parietal pleura on the right. Emphysema. Biapical pleural thickening and scarring is seen. Mild bronchiectasis in the right lower lobe. She denies hx of TB or travel to countries where TB is prevalent. \par \par CT chest 6/17/22 shows mucoid impacted distal airways. Patchy and multifocal ground-glass opacities primarily in the bilateral upper lobes, new from 2011 and likely represented acute infection during the hospitalization. Right apical scarring is new. Left lower lobe 0.7 cm nodule (image 112, series 3) is new.\par Chronic right pleural thickening with calcifications and right basilar atelectasis with associated volume loss.\par The calcification was not present in 2010.\par \par PFTs in 2018 showed severe obstruction\par Echo in June, 2022 showed moderate diastolic dysfunction, normal RV size and function and mild pulmonary hypertension. PA systolic pressure was 49. \par alpha one level was wnl.  CT Chest ordered and PFTs.  Symbicort ordered in addition to Incruse.\par \par Last seen in September 2022. She was feeling better on Symbicort and Incruse Ellipta. \par She was smoking again and she desaturated during exercise oximetry to 85 % and did not want oxygen.  \par CT Chest was ordered.  \par \par CT performed 1/24/23, personally reviewed by me.  Report as follows:\par \par "LUNGS/AIRWAYS/PLEURA: Small amount of tracheal mucoid impaction. There is occlusion of the right lower lobe bronchus resulting in complete lobar collapse of the right lower lobe. There is interval resolution of the multiple areas of groundglass opacities seen on prior examination. There are multiple left lung nodules unchanged in size, largest 0.8 cm in the left lower lobe (3-114) and 1 cm in the left lower lobe (3-83). There is interval increase in the size of the small right pleural effusion. Right pleural thickening and calcification is unchanged. There is a new left-sided pleural effusion. Right apical subpleural consolidation is stable. Stable emphysematous changes.\par \par MEDIASTINUM AND HEBERT: No lymphadenopathy.\par VESSELS: Atherosclerotic disease of the aorta.\par HEART: Heart size is enlarged. Mitral annulus calcifications. No pericardial effusion.\par CHEST WALL AND LOWER NECK: Within normal limits.\par VISUALIZED UPPER ABDOMEN: Within normal limits.\par BONES: Degenerative changes.\par \par IMPRESSION:\par New occlusion of the right lower lobe bronchus resulting in right lower lobar collapse, likely a mucous plug.\par Interval resolution of multiple areas of groundglass opacities.\par Stable left lung nodules and peripheral consolidation involving the right apex. Recommend 6 month follow-up.\par Increased small right and new small left pleural effusions. Unchanged partially calcified right pleural thickening."\par \par Returns for follow up today s/p recent hospitalization at [facility] due to hypoemia spo2 88% and increased shortness of breath with productive cough. Discharged on 1/27/23. CT chest showed an occlusion of the right lower lobe bronchus resulting in complete lobar collapse of the right lower lobe. There are multiple left lung nodules unchanged in size, largest 0.8 cm in the left lower lobe (3-114) and 1 cm in the left lower lobe (3-83). There is a small right pleural effusion and Right pleural thickening. There is a new left-sided pleural effusion. Right apical subpleural consolidation is stable. Stable emphysematous changes.  The CT was personally reviewed by me. \par \par During hospitalizaiton was initially started on antibotics, then stopped due to no sign of active infection. She was diureseed on Lasix 40mg, symbicort, albuterol nev prn and started on supplemented O2. Discharged on lasix 20mg daily, albuterol neb prn, symbicort bid. on 2LPM O2. She has not been using cpap at night since hospitalization because she is using O2 at night. Continues to cough, producing clear thin mucous.  She also had elevated CO2 levels.\par denies chest pain or tightness. no fever, chills or night sweats.

## 2023-01-31 NOTE — PHYSICAL EXAM
[No Acute Distress] : no acute distress [Well Nourished] : well nourished [Well Developed] : well developed [Normal Rate/Rhythm] : normal rate/rhythm [Normal S1, S2] : normal s1, s2 [No Resp Distress] : no resp distress [TextBox_68] : crackles in upper and lower right lung. no audible rhonchi or wheezing [TextBox_105] : mild non pitting edema b/l, LE cool to touch, pedal pulses intact b/l.

## 2023-02-02 ENCOUNTER — NON-APPOINTMENT (OUTPATIENT)
Age: 63
End: 2023-02-02

## 2023-02-02 LAB
ALBUMIN SERPL ELPH-MCNC: 4.6 G/DL
ALP BLD-CCNC: 75 U/L
ALT SERPL-CCNC: 20 U/L
ANION GAP SERPL CALC-SCNC: 9 MMOL/L
AST SERPL-CCNC: 15 U/L
BILIRUB SERPL-MCNC: 0.5 MG/DL
BUN SERPL-MCNC: 14 MG/DL
CALCIUM SERPL-MCNC: 10.8 MG/DL
CHLORIDE SERPL-SCNC: 89 MMOL/L
CO2 SERPL-SCNC: 39 MMOL/L
CREAT SERPL-MCNC: 0.34 MG/DL
EGFR: 118 ML/MIN/1.73M2
GLUCOSE SERPL-MCNC: 98 MG/DL
POTASSIUM SERPL-SCNC: 4.8 MMOL/L
PROT SERPL-MCNC: 6.2 G/DL
SODIUM SERPL-SCNC: 137 MMOL/L

## 2023-02-03 LAB — BACTERIA SPT CULT: NORMAL

## 2023-02-03 NOTE — PROGRESS NOTE ADULT - SUBJECTIVE AND OBJECTIVE BOX
CHIEF COMPLAINT: shortness of breath/hypoxia     Interval events:      PAST MEDICAL & SURGICAL HISTORY:  History of COPD      No significant past surgical history          FAMILY HISTORY:  no history in parents    SOCIAL HISTORY:  Smoking: [ ] Never Smoked [ ] Former Smoker (__ packs x ___ years) [ x] Current Smoker  (__ packs x ___ years)  social etoh    Allergies    No Known Allergies    Intolerances        HOME MEDICATIONS:  Home Medications:  albuterol 2.5 mg/3 mL (0.083%) inhalation solution: 3 milliliter(s) inhaled , As Needed (16 Jun 2022 12:50)  Incruse Ellipta 62.5 mcg/inh inhalation powder: 1 puff(s) inhaled every 24 hours (16 Jun 2022 12:50)      REVIEW OF SYSTEMS:  Constitutional: [ ] negative [x ]no  fevers [x ] no chills [ ] weight loss [ ] weight gain  HEENT: [ ] negative [ ] dry eyes [ ] eye irritation [ ] postnasal drip [ ] nasal congestion  CV: [ ] negative  [x ] no chest pain [x ] no orthopnea [ ] palpitations [ ] murmur  Resp: [ ] negative [ x] cough [x ] shortness of breath [ ] dyspnea [ ] wheezing [ ] sputum [ ] hemoptysis  GI: no n/v/d/c  : [ ] negative [ ] dysuria [ ] nocturia [ ] hematuria [ ] increased urinary frequency  Musculoskeletal: [ ] negative [x ] no back pain [ ] myalgias [ ] arthralgias [ ] fracture  Skin: [ ] negative [ ] rash [ ] itch  Neurological: [ ] negative [ ] headache [ ] dizziness [ ] syncope [ ] weakness [ ] numbness  Psychiatric: [ ] negative [ ] anxiety [ ] depression  Endocrine: [ ] negative [ ] diabetes [ ] thyroid problem  Hematologic/Lymphatic: [ ] negative [ ] anemia [ ] bleeding problem  Allergic/Immunologic: [ ] negative [ ] itchy eyes [ ] nasal discharge [ ] hives [ ] angioedema  [x ] All other systems negative  [ ] Unable to assess ROS because ________    OBJECTIVE:  ICU Vital Signs Last 24 Hrs  T(C): 37 (17 Jun 2022 04:41), Max: 37.1 (16 Jun 2022 22:03)  T(F): 98.6 (17 Jun 2022 04:41), Max: 98.8 (16 Jun 2022 22:03)  HR: 113 (17 Jun 2022 04:41) (106 - 120)  BP: 130/65 (17 Jun 2022 04:41) (118/69 - 172/101)  BP(mean): --  ABP: --  ABP(mean): --  RR: 24 (16 Jun 2022 22:03) (24 - 28)  SpO2: 92% (17 Jun 2022 04:41) (81% - 94%)        CAPILLARY BLOOD GLUCOSE          PHYSICAL EXAM:  General: no acute distress	  HEENT:   Normal oral mucosa, EOMI	  Cardiovascular:  S1 S2, No JVD,  no murmurs  Respiratory: faint crackles at bases bilaterally but no wheezing or use of accessory muscles  Psychiatry: Alert, oriented, normal affect  Gastrointestinal:  Soft, Non-tender, + BS	non distended  Skin: No rashes, normal turgor  Neurologic: No gross/focal deficits appreciated   Extremities:  No edema, thin      LINES:     HOSPITAL MEDICATIONS:  Standing Meds:  albuterol/ipratropium for Nebulization 3 milliLiter(s) Nebulizer every 6 hours  budesonide 160 MICROgram(s)/formoterol 4.5 MICROgram(s) Inhaler 2 Puff(s) Inhalation two times a day  enoxaparin Injectable 40 milliGRAM(s) SubCutaneous every 24 hours  guaiFENesin  milliGRAM(s) Oral every 12 hours  influenza   Vaccine 0.5 milliLiter(s) IntraMuscular once  levoFLOXacin IVPB      levoFLOXacin IVPB 750 milliGRAM(s) IV Intermittent every 24 hours  methylPREDNISolone sodium succinate Injectable 40 milliGRAM(s) IV Push every 12 hours  sodium chloride 0.9%. 1000 milliLiter(s) IV Continuous <Continuous>      PRN Meds:  acetaminophen     Tablet .. 650 milliGRAM(s) Oral every 6 hours PRN  benzonatate 100 milliGRAM(s) Oral three times a day PRN  melatonin 3 milliGRAM(s) Oral at bedtime PRN  ondansetron Injectable 4 milliGRAM(s) IV Push every 8 hours PRN      LABS:                        15.0   9.95  )-----------( 231      ( 17 Jun 2022 06:38 )             46.6     Hgb Trend: 15.0<--, 17.7<--  06-17    133<L>  |  93<L>  |  10  ----------------------------<  126<H>  4.7   |  33<H>  |  0.34<L>    Ca    10.2      17 Jun 2022 06:38  Phos  2.8     06-17    TPro  6.3  /  Alb  3.6  /  TBili  0.2  /  DBili  x   /  AST  17  /  ALT  32  /  AlkPhos  139<H>  06-17    Creatinine Trend: 0.34<--, 0.37<--        Venous Blood Gas:  06-16 @ 14:55  7.30/65/69/32/94.1  VBG Lactate: 1.0      MICROBIOLOGY:     Culture - Sputum (collected 16 Jun 2022 16:06)  Source: .Sputum Sputum  Gram Stain (16 Jun 2022 22:28):    Few polymorphonuclear leukocytes per low power field    No Squamous epithelial cells per low power field    Few Gram positive cocci in pairs per oil power field    Few Gram Variable Rods per oil power field    Rare Yeast like cells per oil power field        RADIOLOGY:  [x ] Reviewed and interpreted by me  cxr: right midlung opacity, ?LLL infiltrate    PULMONARY FUNCTION TESTS:    EKG:   CHIEF COMPLAINT: shortness of breath/hypoxia     Interval events:  - patient seen and examined this AM, reports continued improvement in respiratory symptoms      PAST MEDICAL & SURGICAL HISTORY:  History of COPD      No significant past surgical history          FAMILY HISTORY:  no history in parents    SOCIAL HISTORY:  Smoking: [ ] Never Smoked [ ] Former Smoker (__ packs x ___ years) [ x] Current Smoker  (__ packs x ___ years)  social etoh    Allergies    No Known Allergies    Intolerances        HOME MEDICATIONS:  Home Medications:  albuterol 2.5 mg/3 mL (0.083%) inhalation solution: 3 milliliter(s) inhaled , As Needed (16 Jun 2022 12:50)  Incruse Ellipta 62.5 mcg/inh inhalation powder: 1 puff(s) inhaled every 24 hours (16 Jun 2022 12:50)      REVIEW OF SYSTEMS:  Constitutional: [ ] negative [x ]no  fevers [x ] no chills [ ] weight loss [ ] weight gain  HEENT: [ ] negative [ ] dry eyes [ ] eye irritation [ ] postnasal drip [ ] nasal congestion  CV: [ ] negative  [x ] no chest pain [x ] no orthopnea [ ] palpitations [ ] murmur  Resp: [ ] negative [ x] cough [x ] shortness of breath [ ] dyspnea [ ] wheezing [ ] sputum [ ] hemoptysis  GI: no n/v/d/c  : [ ] negative [ ] dysuria [ ] nocturia [ ] hematuria [ ] increased urinary frequency  Musculoskeletal: [ ] negative [x ] no back pain [ ] myalgias [ ] arthralgias [ ] fracture  Skin: [ ] negative [ ] rash [ ] itch  Neurological: [ ] negative [ ] headache [ ] dizziness [ ] syncope [ ] weakness [ ] numbness  Psychiatric: [ ] negative [ ] anxiety [ ] depression  Endocrine: [ ] negative [ ] diabetes [ ] thyroid problem  Hematologic/Lymphatic: [ ] negative [ ] anemia [ ] bleeding problem  Allergic/Immunologic: [ ] negative [ ] itchy eyes [ ] nasal discharge [ ] hives [ ] angioedema  [x ] All other systems negative  [ ] Unable to assess ROS because ________    OBJECTIVE:  ICU Vital Signs Last 24 Hrs  T(C): 37 (17 Jun 2022 04:41), Max: 37.1 (16 Jun 2022 22:03)  T(F): 98.6 (17 Jun 2022 04:41), Max: 98.8 (16 Jun 2022 22:03)  HR: 113 (17 Jun 2022 04:41) (106 - 120)  BP: 130/65 (17 Jun 2022 04:41) (118/69 - 172/101)  BP(mean): --  ABP: --  ABP(mean): --  RR: 24 (16 Jun 2022 22:03) (24 - 28)  SpO2: 92% (17 Jun 2022 04:41) (81% - 94%)        CAPILLARY BLOOD GLUCOSE          PHYSICAL EXAM:  General: no acute distress	  HEENT:   Normal oral mucosa, EOMI	  Cardiovascular:  S1 S2, No JVD,  no murmurs  Respiratory: faint crackles at bases bilaterally but no wheezing or use of accessory muscles  Psychiatry: Alert, oriented, normal affect  Gastrointestinal:  Soft, Non-tender, + BS	non distended  Skin: No rashes, normal turgor  Neurologic: No gross/focal deficits appreciated   Extremities:  No edema, thin      LINES:     HOSPITAL MEDICATIONS:  Standing Meds:  albuterol/ipratropium for Nebulization 3 milliLiter(s) Nebulizer every 6 hours  budesonide 160 MICROgram(s)/formoterol 4.5 MICROgram(s) Inhaler 2 Puff(s) Inhalation two times a day  enoxaparin Injectable 40 milliGRAM(s) SubCutaneous every 24 hours  guaiFENesin  milliGRAM(s) Oral every 12 hours  influenza   Vaccine 0.5 milliLiter(s) IntraMuscular once  levoFLOXacin IVPB      levoFLOXacin IVPB 750 milliGRAM(s) IV Intermittent every 24 hours  methylPREDNISolone sodium succinate Injectable 40 milliGRAM(s) IV Push every 12 hours  sodium chloride 0.9%. 1000 milliLiter(s) IV Continuous <Continuous>      PRN Meds:  acetaminophen     Tablet .. 650 milliGRAM(s) Oral every 6 hours PRN  benzonatate 100 milliGRAM(s) Oral three times a day PRN  melatonin 3 milliGRAM(s) Oral at bedtime PRN  ondansetron Injectable 4 milliGRAM(s) IV Push every 8 hours PRN      LABS:                        15.0   9.95  )-----------( 231      ( 17 Jun 2022 06:38 )             46.6     Hgb Trend: 15.0<--, 17.7<--  06-17    133<L>  |  93<L>  |  10  ----------------------------<  126<H>  4.7   |  33<H>  |  0.34<L>    Ca    10.2      17 Jun 2022 06:38  Phos  2.8     06-17    TPro  6.3  /  Alb  3.6  /  TBili  0.2  /  DBili  x   /  AST  17  /  ALT  32  /  AlkPhos  139<H>  06-17    Creatinine Trend: 0.34<--, 0.37<--        Venous Blood Gas:  06-16 @ 14:55  7.30/65/69/32/94.1  VBG Lactate: 1.0      MICROBIOLOGY:     Culture - Sputum (collected 16 Jun 2022 16:06)  Source: .Sputum Sputum  Gram Stain (16 Jun 2022 22:28):    Few polymorphonuclear leukocytes per low power field    No Squamous epithelial cells per low power field    Few Gram positive cocci in pairs per oil power field    Few Gram Variable Rods per oil power field    Rare Yeast like cells per oil power field        RADIOLOGY:  [x ] Reviewed and interpreted by me  cxr: right midlung opacity, ?LLL infiltrate    PULMONARY FUNCTION TESTS:    EKG: Diagnosis:    Protocol/Chemo Regimen:    Day:     Pt endorsed:    Review of Systems:     Pain scale:     Diet:     Allergies    No Known Allergies    Intolerances        ANTIMICROBIALS  acyclovir   Oral Tab/Cap 400 milliGRAM(s) Oral every 8 hours  caspofungin IVPB 50 milliGRAM(s) IV Intermittent every 24 hours  levoFLOXacin  Tablet 500 milliGRAM(s) Oral every 24 hours      HEME/ONC MEDICATIONS  dasatinib 140 milliGRAM(s) Oral daily  methotrexate PF IntraThecal (eMAR) 12 milliGRAM(s) IntraThecal once      STANDING MEDICATIONS  ascorbic acid 500 milliGRAM(s) Oral daily  atorvastatin 80 milliGRAM(s) Oral at bedtime  chlorhexidine 4% Liquid 1 Application(s) Topical <User Schedule>  cyanocobalamin 1000 MICROGram(s) Oral daily  dextrose 5%. 1000 milliLiter(s) IV Continuous <Continuous>  dextrose 5%. 1000 milliLiter(s) IV Continuous <Continuous>  dextrose 50% Injectable 25 Gram(s) IV Push once  dextrose 50% Injectable 12.5 Gram(s) IV Push once  diltiazem    milliGRAM(s) Oral daily  glucagon  Injectable 1 milliGRAM(s) IntraMuscular once  hydrochlorothiazide 25 milliGRAM(s) Oral daily  hydrocortisone 2.5% Rectal Cream 1 Application(s) Rectal two times a day  insulin lispro (ADMELOG) corrective regimen sliding scale   SubCutaneous three times a day before meals  melatonin 5 milliGRAM(s) Oral at bedtime  pantoprazole    Tablet 40 milliGRAM(s) Oral before breakfast  sodium chloride 0.9%. 1000 milliLiter(s) IV Continuous <Continuous>  valsartan 320 milliGRAM(s) Oral daily  zinc oxide 40% Paste 1 Application(s) Topical two times a day      PRN MEDICATIONS  acetaminophen     Tablet .. 650 milliGRAM(s) Oral every 6 hours PRN  aluminum hydroxide/magnesium hydroxide/simethicone Suspension 30 milliLiter(s) Oral every 4 hours PRN  dextrose Oral Gel 15 Gram(s) Oral once PRN  loperamide 2 milliGRAM(s) Oral three times a day PRN  ondansetron Injectable 8 milliGRAM(s) IV Push every 8 hours PRN  sodium chloride 0.9% lock flush 10 milliLiter(s) IV Push every 1 hour PRN        Vital Signs Last 24 Hrs  T(C): 36.7 (03 Feb 2023 04:51), Max: 36.9 (02 Feb 2023 09:00)  T(F): 98 (03 Feb 2023 04:51), Max: 98.4 (02 Feb 2023 09:00)  HR: 78 (03 Feb 2023 04:51) (73 - 80)  BP: 113/66 (03 Feb 2023 04:51) (100/60 - 115/69)  BP(mean): --  RR: 18 (03 Feb 2023 04:51) (18 - 18)  SpO2: 97% (03 Feb 2023 04:51) (96% - 99%)    Parameters below as of 03 Feb 2023 04:51  Patient On (Oxygen Delivery Method): room air        PHYSICAL EXAM  General: NAD  HEENT: PERRLA, EOMOI, clear oropharynx, anicteric sclera, pink conjunctiva  Neck: supple  CV: (+) S1/S2 RRR  Lungs: clear to auscultation, no wheezes or rales  Abdomen: soft, non-tender, non-distended (+) BS  Ext: no clubbing, cyanosis or edema  Skin: no rashes and no petechiae  Neuro: alert and oriented X 3, no focal deficits  Central Line:     RECENT CULTURES:        LABS:                        7.1    0.82  )-----------( 24       ( 02 Feb 2023 07:07 )             21.4         Mean Cell Volume : 84.9 fl  Mean Cell Hemoglobin : 28.2 pg  Mean Cell Hemoglobin Concentration : 33.2 gm/dL  Auto Neutrophil # : 0.03 K/uL  Auto Lymphocyte # : 0.78 K/uL  Auto Monocyte # : 0.00 K/uL  Auto Eosinophil # : 0.01 K/uL  Auto Basophil # : 0.00 K/uL  Auto Neutrophil % : 4.0 %  Auto Lymphocyte % : 94.7 %  Auto Monocyte % : 0.0 %  Auto Eosinophil % : 1.3 %  Auto Basophil % : 0.0 %      02-02    138  |  108  |  7   ----------------------------<  92  3.5   |  24  |  0.57    Ca    7.3<L>      02 Feb 2023 07:07  Phos  1.8     02-02  Mg     2.1     02-02    TPro  5.0<L>  /  Alb  3.0<L>  /  TBili  0.6  /  DBili  x   /  AST  49<H>  /  ALT  56<H>  /  AlkPhos  64  02-02          PT/INR - ( 02 Feb 2023 07:07 )   PT: 10.5 sec;   INR: 0.91 ratio         PTT - ( 02 Feb 2023 07:07 )  PTT:28.9 sec        RADIOLOGY & ADDITIONAL STUDIES:         Diagnosis: newly diagnosed B-ALL, Ph (+)    Protocol/Chemo Regimen: Following 60047 (Decadron days 1-7 + Dasatinib), Vincristine 1.5mg (on D8)    Day: 17     Pt endorsed: +fatigue    Review of Systems: Denies any nausea, vomiting, diarrhea, chest pain, palpitation, SOB or abdominal pain.    Pain scale: denies    Diet: regular    Allergies: No Known Allergies    ANTIMICROBIALS  acyclovir   Oral Tab/Cap 400 milliGRAM(s) Oral every 8 hours  caspofungin IVPB 50 milliGRAM(s) IV Intermittent every 24 hours  levoFLOXacin  Tablet 500 milliGRAM(s) Oral every 24 hours    HEME/ONC MEDICATIONS  dasatinib 140 milliGRAM(s) Oral daily  methotrexate PF IntraThecal (eMAR) 12 milliGRAM(s) IntraThecal once    STANDING MEDICATIONS  ascorbic acid 500 milliGRAM(s) Oral daily  atorvastatin 80 milliGRAM(s) Oral at bedtime  chlorhexidine 4% Liquid 1 Application(s) Topical <User Schedule>  cyanocobalamin 1000 MICROGram(s) Oral daily  dextrose 5%. 1000 milliLiter(s) IV Continuous <Continuous>  dextrose 5%. 1000 milliLiter(s) IV Continuous <Continuous>  dextrose 50% Injectable 25 Gram(s) IV Push once  dextrose 50% Injectable 12.5 Gram(s) IV Push once  diltiazem    milliGRAM(s) Oral daily  glucagon  Injectable 1 milliGRAM(s) IntraMuscular once  hydrochlorothiazide 25 milliGRAM(s) Oral daily  hydrocortisone 2.5% Rectal Cream 1 Application(s) Rectal two times a day  insulin lispro (ADMELOG) corrective regimen sliding scale   SubCutaneous three times a day before meals  melatonin 5 milliGRAM(s) Oral at bedtime  pantoprazole    Tablet 40 milliGRAM(s) Oral before breakfast  sodium chloride 0.9%. 1000 milliLiter(s) IV Continuous <Continuous>  valsartan 320 milliGRAM(s) Oral daily  zinc oxide 40% Paste 1 Application(s) Topical two times a day    PRN MEDICATIONS  acetaminophen     Tablet .. 650 milliGRAM(s) Oral every 6 hours PRN  aluminum hydroxide/magnesium hydroxide/simethicone Suspension 30 milliLiter(s) Oral every 4 hours PRN  dextrose Oral Gel 15 Gram(s) Oral once PRN  loperamide 2 milliGRAM(s) Oral three times a day PRN  ondansetron Injectable 8 milliGRAM(s) IV Push every 8 hours PRN  sodium chloride 0.9% lock flush 10 milliLiter(s) IV Push every 1 hour PRN    Vital Signs Last 24 Hrs  T(C): 36.7 (03 Feb 2023 04:51), Max: 36.9 (02 Feb 2023 09:00)  T(F): 98 (03 Feb 2023 04:51), Max: 98.4 (02 Feb 2023 09:00)  HR: 78 (03 Feb 2023 04:51) (73 - 80)  BP: 113/66 (03 Feb 2023 04:51) (100/60 - 115/69)  BP(mean): --  RR: 18 (03 Feb 2023 04:51) (18 - 18)  SpO2: 97% (03 Feb 2023 04:51) (96% - 99%)    Parameters below as of 03 Feb 2023 04:51  Patient On (Oxygen Delivery Method): room air    PHYSICAL EXAM  General: NAD  HEENT: Sclerae anicteric, clear oropharynx, no oral lesions  CV: normal S1/S2, reg  Lungs: breath sounds clear and equal bilaterally  Abdomen: soft non-tender non-distended  Ext: no edema  Skin: no rash  Neuro: alert and oriented X 4  Central Line: RUE PICC clean, dry, intact    RECENT CULTURES:  Culture - Stool (01.21.23 @ 12:49)    Specimen Source: .Stool Feces    Culture Results:   No enteric pathogens isolated.  (Stool culture examined for Salmonella,  Shigella, Campylobacter, Aeromonas, Plesiomonas,  Vibrio, E.coli O157 and Yersinia)    LABS:                       6.9    1.03  )-----------( 18       ( 03 Feb 2023 06:54 )             20.8     Mean Cell Volume : 84.9 fl  Mean Cell Hemoglobin : 28.2 pg  Mean Cell Hemoglobin Concentration : 33.2 gm/dL  Auto Neutrophil # : 0.11 K/uL  Auto Lymphocyte # : 0.90 K/uL  Auto Monocyte # : 0.01 K/uL  Auto Eosinophil # : 0.00 K/uL  Auto Basophil # : 0.00 K/uL  Auto Neutrophil % : 11.1 %  Auto Lymphocyte % : 87.5 %  Auto Monocyte % : 1.4 %  Auto Eosinophil % : 0.0 %  Auto Basophil % : 0.0 %    02-03    137  |  107  |  7   ----------------------------<  118<H>  3.3<L>   |  22  |  0.61    Ca    7.6<L>      03 Feb 2023 06:54  Phos  2.0     02-03  Mg     1.9     02-03    TPro  5.1<L>  /  Alb  3.1<L>  /  TBili  0.5  /  DBili  x   /  AST  85<H>  /  ALT  82<H>  /  AlkPhos  68  02-03  Mg 1.9  Phos 2.0  PT/INR - ( 03 Feb 2023 06:54 )   PT: 10.3 sec;   INR: 0.89 ratio    PTT - ( 03 Feb 2023 06:54 )  PTT:26.3 sec    Uric Acid 1.5    RADIOLOGY & ADDITIONAL STUDIES:  Xray Chest 1 View- PORTABLE-Urgent (Xray Chest 1 View- PORTABLE-Urgent .) (01.16.23 @ 13:16) >  Right PICC terminating within the distal SVC.

## 2023-02-28 ENCOUNTER — APPOINTMENT (OUTPATIENT)
Dept: CARDIOLOGY | Facility: CLINIC | Age: 63
End: 2023-02-28
Payer: COMMERCIAL

## 2023-02-28 ENCOUNTER — NON-APPOINTMENT (OUTPATIENT)
Age: 63
End: 2023-02-28

## 2023-02-28 ENCOUNTER — APPOINTMENT (OUTPATIENT)
Dept: CT IMAGING | Facility: CLINIC | Age: 63
End: 2023-02-28
Payer: COMMERCIAL

## 2023-02-28 ENCOUNTER — OUTPATIENT (OUTPATIENT)
Dept: OUTPATIENT SERVICES | Facility: HOSPITAL | Age: 63
LOS: 1 days | End: 2023-02-28
Payer: COMMERCIAL

## 2023-02-28 VITALS
SYSTOLIC BLOOD PRESSURE: 133 MMHG | HEIGHT: 66 IN | WEIGHT: 115 LBS | BODY MASS INDEX: 18.48 KG/M2 | HEART RATE: 102 BPM | OXYGEN SATURATION: 92 % | DIASTOLIC BLOOD PRESSURE: 81 MMHG

## 2023-02-28 DIAGNOSIS — J18.9 PNEUMONIA, UNSPECIFIED ORGANISM: ICD-10-CM

## 2023-02-28 PROCEDURE — 71250 CT THORAX DX C-: CPT | Mod: 26

## 2023-02-28 PROCEDURE — 71250 CT THORAX DX C-: CPT

## 2023-02-28 PROCEDURE — 93000 ELECTROCARDIOGRAM COMPLETE: CPT

## 2023-02-28 RX ORDER — LEVOFLOXACIN 500 MG/1
500 TABLET, FILM COATED ORAL DAILY
Qty: 5 | Refills: 0 | Status: DISCONTINUED | COMMUNITY
Start: 2023-01-31 | End: 2023-02-28

## 2023-02-28 NOTE — HISTORY OF PRESENT ILLNESS
[FreeTextEntry1] : Graciela is a 62-year-old female s/p hospitalization with COPD exacerbation. Active smoker. REcurrent intubation in past for pneumonia. ECHO done in hospital moderate diastolic dysfunction, RVE, Swelling of ankles in hospital . Since home did not take any.  Pulmonary pressure in the 40s unchanged from 2021.

## 2023-02-28 NOTE — PHYSICAL EXAM
[Well Developed] : well developed [Well Nourished] : well nourished [No Acute Distress] : no acute distress [Normal Conjunctiva] : normal conjunctiva [Normal Venous Pressure] : normal venous pressure [No Carotid Bruit] : no carotid bruit [Normal S1, S2] : normal S1, S2 [No Murmur] : no murmur [No Rub] : no rub [No Gallop] : no gallop [No Respiratory Distress] : no respiratory distress  [Wheeze ____] : wheeze [unfilled] [Soft] : abdomen soft [Non Tender] : non-tender [No Masses/organomegaly] : no masses/organomegaly [Normal Bowel Sounds] : normal bowel sounds [Normal Gait] : normal gait [No Edema] : no edema [No Cyanosis] : no cyanosis [No Clubbing] : no clubbing [No Varicosities] : no varicosities [No Rash] : no rash [No Skin Lesions] : no skin lesions [Moves all extremities] : moves all extremities [No Focal Deficits] : no focal deficits [Normal Speech] : normal speech [Alert and Oriented] : alert and oriented [Normal memory] : normal memory

## 2023-02-28 NOTE — DISCUSSION/SUMMARY
[FreeTextEntry1] : The patient is a 62-year-old female ex-smoker, HFpEF, COPD s/p hospitalization for exacerbation who is tachycardic.\par #1 CV-start diltiazem 30 mg twice a day and titrate to heart rate control, PAS 44\par #2 HFpEF-stage II diastolic dysfunction, only use furosemide as needed for fluid overload\par #3 COPD-compliance with inhalers discussed\par  [EKG obtained to assist in diagnosis and management of assessed problem(s)] : EKG obtained to assist in diagnosis and management of assessed problem(s)

## 2023-03-10 ENCOUNTER — NON-APPOINTMENT (OUTPATIENT)
Age: 63
End: 2023-03-10

## 2023-03-20 LAB — ACID FAST STN SPT: NORMAL

## 2023-05-30 ENCOUNTER — NON-APPOINTMENT (OUTPATIENT)
Age: 63
End: 2023-05-30

## 2023-05-30 ENCOUNTER — APPOINTMENT (OUTPATIENT)
Dept: CARDIOLOGY | Facility: CLINIC | Age: 63
End: 2023-05-30
Payer: COMMERCIAL

## 2023-05-30 VITALS
WEIGHT: 120 LBS | HEART RATE: 93 BPM | DIASTOLIC BLOOD PRESSURE: 75 MMHG | SYSTOLIC BLOOD PRESSURE: 129 MMHG | BODY MASS INDEX: 19.37 KG/M2

## 2023-05-30 DIAGNOSIS — I50.32 CHRONIC DIASTOLIC (CONGESTIVE) HEART FAILURE: ICD-10-CM

## 2023-05-30 PROCEDURE — 93000 ELECTROCARDIOGRAM COMPLETE: CPT

## 2023-05-30 PROCEDURE — 99214 OFFICE O/P EST MOD 30 MIN: CPT | Mod: 25

## 2023-05-30 NOTE — DISCUSSION/SUMMARY
[FreeTextEntry1] : The patient is a 62-year-old female ex-smoker, HFpEF, COPD whose symptoms improved on low dose diltiazem.\par #1 CV- c/w diltiazem 30 mg twice a day, PAS 44\par #2 HFpEF- stage II diastolic dysfunction, only use furosemide as needed for fluid overload\par #3 COPD- compliance with inhalers, carries pulse oxim, recent CT stable\par  [EKG obtained to assist in diagnosis and management of assessed problem(s)] : EKG obtained to assist in diagnosis and management of assessed problem(s)

## 2023-05-30 NOTE — HISTORY OF PRESENT ILLNESS
[FreeTextEntry1] : Graciela is feeling better on diltiazem. Heart rate better when she tries to walk and feels she can go further. Carries her pulse ox with her and adjusts activities prn.

## 2023-07-24 RX ORDER — UMECLIDINIUM 62.5 UG/1
62.5 AEROSOL, POWDER ORAL
Qty: 3 | Refills: 3 | Status: DISCONTINUED | COMMUNITY
Start: 2019-08-15 | End: 2023-07-24

## 2023-07-24 RX ORDER — UMECLIDINIUM BROMIDE AND VILANTEROL TRIFENATATE 62.5; 25 UG/1; UG/1
62.5-25 POWDER RESPIRATORY (INHALATION) DAILY
Qty: 1 | Refills: 2 | Status: ACTIVE | COMMUNITY
Start: 2023-07-24 | End: 1900-01-01

## 2023-08-30 ENCOUNTER — RX RENEWAL (OUTPATIENT)
Age: 63
End: 2023-08-30

## 2023-08-30 RX ORDER — DILTIAZEM HYDROCHLORIDE 30 MG/1
30 TABLET ORAL
Qty: 180 | Refills: 3 | Status: ACTIVE | COMMUNITY
Start: 2023-02-28 | End: 1900-01-01

## 2023-09-28 ENCOUNTER — RX RENEWAL (OUTPATIENT)
Age: 63
End: 2023-09-28

## 2023-10-06 ENCOUNTER — OUTPATIENT (OUTPATIENT)
Dept: OUTPATIENT SERVICES | Facility: HOSPITAL | Age: 63
LOS: 1 days | End: 2023-10-06
Payer: COMMERCIAL

## 2023-10-06 ENCOUNTER — APPOINTMENT (OUTPATIENT)
Dept: CT IMAGING | Facility: IMAGING CENTER | Age: 63
End: 2023-10-06
Payer: COMMERCIAL

## 2023-10-06 DIAGNOSIS — J18.9 PNEUMONIA, UNSPECIFIED ORGANISM: ICD-10-CM

## 2023-10-06 DIAGNOSIS — J90 PLEURAL EFFUSION, NOT ELSEWHERE CLASSIFIED: ICD-10-CM

## 2023-10-06 DIAGNOSIS — J44.9 CHRONIC OBSTRUCTIVE PULMONARY DISEASE, UNSPECIFIED: ICD-10-CM

## 2023-10-06 PROCEDURE — 71250 CT THORAX DX C-: CPT | Mod: 26

## 2023-10-06 PROCEDURE — 71250 CT THORAX DX C-: CPT

## 2023-10-09 ENCOUNTER — RX RENEWAL (OUTPATIENT)
Age: 63
End: 2023-10-09

## 2023-10-09 RX ORDER — FUROSEMIDE 20 MG/1
20 TABLET ORAL DAILY
Qty: 90 | Refills: 2 | Status: ACTIVE | COMMUNITY
Start: 2018-02-21 | End: 1900-01-01

## 2023-10-12 ENCOUNTER — RX RENEWAL (OUTPATIENT)
Age: 63
End: 2023-10-12

## 2023-10-12 RX ORDER — BUDESONIDE AND FORMOTEROL FUMARATE DIHYDRATE 160; 4.5 UG/1; UG/1
160-4.5 AEROSOL RESPIRATORY (INHALATION) TWICE DAILY
Qty: 1 | Refills: 1 | Status: ACTIVE | COMMUNITY
Start: 2022-07-12 | End: 1900-01-01

## 2023-10-17 ENCOUNTER — APPOINTMENT (OUTPATIENT)
Dept: PULMONOLOGY | Facility: CLINIC | Age: 63
End: 2023-10-17
Payer: COMMERCIAL

## 2023-10-17 VITALS
HEART RATE: 90 BPM | BODY MASS INDEX: 19.29 KG/M2 | OXYGEN SATURATION: 91 % | RESPIRATION RATE: 16 BRPM | DIASTOLIC BLOOD PRESSURE: 80 MMHG | HEIGHT: 66 IN | TEMPERATURE: 97.7 F | SYSTOLIC BLOOD PRESSURE: 146 MMHG | WEIGHT: 120 LBS

## 2023-10-17 DIAGNOSIS — I27.20 PULMONARY HYPERTENSION, UNSPECIFIED: ICD-10-CM

## 2023-10-17 DIAGNOSIS — J96.92 RESPIRATORY FAILURE, UNSPECIFIED WITH HYPERCAPNIA: ICD-10-CM

## 2023-10-17 DIAGNOSIS — J44.9 CHRONIC OBSTRUCTIVE PULMONARY DISEASE, UNSPECIFIED: ICD-10-CM

## 2023-10-17 DIAGNOSIS — G47.30 SLEEP APNEA, UNSPECIFIED: ICD-10-CM

## 2023-10-17 DIAGNOSIS — Z72.0 TOBACCO USE: ICD-10-CM

## 2023-10-17 DIAGNOSIS — R91.8 OTHER NONSPECIFIC ABNORMAL FINDING OF LUNG FIELD: ICD-10-CM

## 2023-10-17 PROCEDURE — 82803 BLOOD GASES ANY COMBINATION: CPT

## 2023-10-17 PROCEDURE — 99215 OFFICE O/P EST HI 40 MIN: CPT | Mod: 25

## 2023-10-17 PROCEDURE — G0296 VISIT TO DETERM LDCT ELIG: CPT

## 2023-10-17 PROCEDURE — 36600 WITHDRAWAL OF ARTERIAL BLOOD: CPT | Mod: 59

## 2023-10-17 PROCEDURE — 99406 BEHAV CHNG SMOKING 3-10 MIN: CPT

## 2023-10-23 PROBLEM — R91.8 LUNG NODULES: Status: ACTIVE | Noted: 2023-10-23

## 2023-10-23 PROBLEM — Z72.0 TOBACCO USE: Status: ACTIVE | Noted: 2023-10-23

## 2023-10-23 RX ORDER — TIOTROPIUM BROMIDE 18 UG/1
18 CAPSULE ORAL; RESPIRATORY (INHALATION) DAILY
Qty: 1 | Refills: 5 | Status: ACTIVE | COMMUNITY
Start: 2023-10-23 | End: 1900-01-01

## 2023-11-02 ENCOUNTER — RX RENEWAL (OUTPATIENT)
Age: 63
End: 2023-11-02

## 2023-11-03 ENCOUNTER — RX RENEWAL (OUTPATIENT)
Age: 63
End: 2023-11-03

## 2023-11-07 ENCOUNTER — NON-APPOINTMENT (OUTPATIENT)
Age: 63
End: 2023-11-07

## 2023-11-10 ENCOUNTER — NON-APPOINTMENT (OUTPATIENT)
Age: 63
End: 2023-11-10

## 2023-11-14 ENCOUNTER — NON-APPOINTMENT (OUTPATIENT)
Age: 63
End: 2023-11-14

## 2023-11-15 RX ORDER — FLUTICASONE FUROATE AND VILANTEROL TRIFENATATE 100; 25 UG/1; UG/1
100-25 POWDER RESPIRATORY (INHALATION)
Qty: 1 | Refills: 3 | Status: ACTIVE | COMMUNITY
Start: 2023-11-15 | End: 1900-01-01

## 2023-11-25 ENCOUNTER — NON-APPOINTMENT (OUTPATIENT)
Age: 63
End: 2023-11-25

## 2023-12-05 ENCOUNTER — APPOINTMENT (OUTPATIENT)
Dept: CARDIOLOGY | Facility: CLINIC | Age: 63
End: 2023-12-05

## 2023-12-06 ENCOUNTER — RX RENEWAL (OUTPATIENT)
Age: 63
End: 2023-12-06

## 2024-01-18 ENCOUNTER — APPOINTMENT (OUTPATIENT)
Dept: CARDIOLOGY | Facility: CLINIC | Age: 64
End: 2024-01-18

## 2024-04-07 ENCOUNTER — NON-APPOINTMENT (OUTPATIENT)
Age: 64
End: 2024-04-07

## 2024-04-13 ENCOUNTER — APPOINTMENT (OUTPATIENT)
Dept: SLEEP CENTER | Facility: CLINIC | Age: 64
End: 2024-04-13

## 2024-05-14 ENCOUNTER — RX RENEWAL (OUTPATIENT)
Age: 64
End: 2024-05-14

## 2024-07-08 ENCOUNTER — RX RENEWAL (OUTPATIENT)
Age: 64
End: 2024-07-08

## 2024-09-03 ENCOUNTER — RX RENEWAL (OUTPATIENT)
Age: 64
End: 2024-09-03

## 2024-09-06 ENCOUNTER — RX RENEWAL (OUTPATIENT)
Age: 64
End: 2024-09-06

## 2024-09-07 ENCOUNTER — NON-APPOINTMENT (OUTPATIENT)
Age: 64
End: 2024-09-07

## 2024-09-30 ENCOUNTER — RX RENEWAL (OUTPATIENT)
Age: 64
End: 2024-09-30

## 2024-12-24 ENCOUNTER — RX RENEWAL (OUTPATIENT)
Age: 64
End: 2024-12-24

## 2024-12-24 PROBLEM — F10.90 ALCOHOL USE: Status: INACTIVE | Noted: 2018-02-21

## 2025-01-17 ENCOUNTER — RX RENEWAL (OUTPATIENT)
Age: 65
End: 2025-01-17

## 2025-03-25 ENCOUNTER — APPOINTMENT (OUTPATIENT)
Dept: CARDIOLOGY | Facility: CLINIC | Age: 65
End: 2025-03-25

## 2025-04-01 ENCOUNTER — RX RENEWAL (OUTPATIENT)
Age: 65
End: 2025-04-01

## 2025-04-09 ENCOUNTER — APPOINTMENT (OUTPATIENT)
Dept: PULMONOLOGY | Facility: CLINIC | Age: 65
End: 2025-04-09

## 2025-05-13 ENCOUNTER — NON-APPOINTMENT (OUTPATIENT)
Age: 65
End: 2025-05-13

## 2025-06-06 NOTE — ED ADULT NURSE NOTE - NS_SISCREENINGSR_GEN_ALL_ED
[Cooperative] : cooperative [Depressed] : depressed [Anxious] : anxious [Full] : full [Clear] : clear [Linear/Goal Directed] : linear/goal directed [None] : none [None Reported] : none reported [Average] : average [WNL] : within normal limits Negative